# Patient Record
Sex: FEMALE | Race: WHITE | URBAN - METROPOLITAN AREA
[De-identification: names, ages, dates, MRNs, and addresses within clinical notes are randomized per-mention and may not be internally consistent; named-entity substitution may affect disease eponyms.]

---

## 2023-11-06 ENCOUNTER — TELEPHONE (OUTPATIENT)
Dept: GASTROENTEROLOGY | Facility: CLINIC | Age: 63
End: 2023-11-06
Payer: COMMERCIAL

## 2023-11-06 DIAGNOSIS — K57.92 ACUTE DIVERTICULITIS: Primary | ICD-10-CM

## 2023-11-06 RX ORDER — AMOXICILLIN AND CLAVULANATE POTASSIUM 875; 125 MG/1; MG/1
875 TABLET, FILM COATED ORAL 2 TIMES DAILY
Qty: 20 TABLET | Refills: 0 | Status: SHIPPED | OUTPATIENT
Start: 2023-11-06 | End: 2023-11-22 | Stop reason: WASHOUT

## 2023-11-06 NOTE — TELEPHONE ENCOUNTER
Pt states she is having a diverticulitis flare and wants to know if you could send something to pharmacy? Prefer Amoxicillin states it doesn't make sick

## 2023-11-07 ENCOUNTER — HOSPITAL ENCOUNTER (INPATIENT)
Facility: HOSPITAL | Age: 63
LOS: 1 days | Discharge: HOME | DRG: 392 | End: 2023-11-09
Attending: EMERGENCY MEDICINE | Admitting: INTERNAL MEDICINE
Payer: COMMERCIAL

## 2023-11-07 ENCOUNTER — APPOINTMENT (OUTPATIENT)
Dept: RADIOLOGY | Facility: HOSPITAL | Age: 63
DRG: 392 | End: 2023-11-07
Payer: COMMERCIAL

## 2023-11-07 DIAGNOSIS — K57.92 DIVERTICULITIS: Primary | ICD-10-CM

## 2023-11-07 DIAGNOSIS — K57.20 COLONIC DIVERTICULAR ABSCESS: ICD-10-CM

## 2023-11-07 LAB
ALBUMIN SERPL-MCNC: 3.8 G/DL (ref 3.5–5)
ALP BLD-CCNC: 85 U/L (ref 35–125)
ALT SERPL-CCNC: 9 U/L (ref 5–40)
ANION GAP SERPL CALC-SCNC: 14 MMOL/L
APPEARANCE UR: CLEAR
AST SERPL-CCNC: 18 U/L (ref 5–40)
BASOPHILS # BLD AUTO: 0.03 X10*3/UL (ref 0–0.1)
BASOPHILS NFR BLD AUTO: 0.4 %
BILIRUB SERPL-MCNC: 0.3 MG/DL (ref 0.1–1.2)
BILIRUB UR STRIP.AUTO-MCNC: NEGATIVE MG/DL
BUN SERPL-MCNC: 11 MG/DL (ref 8–25)
CALCIUM SERPL-MCNC: 9.1 MG/DL (ref 8.5–10.4)
CHLORIDE SERPL-SCNC: 97 MMOL/L (ref 97–107)
CO2 SERPL-SCNC: 23 MMOL/L (ref 24–31)
COLOR UR: COLORLESS
CREAT SERPL-MCNC: 0.8 MG/DL (ref 0.4–1.6)
EOSINOPHIL # BLD AUTO: 0.11 X10*3/UL (ref 0–0.7)
EOSINOPHIL NFR BLD AUTO: 1.5 %
ERYTHROCYTE [DISTWIDTH] IN BLOOD BY AUTOMATED COUNT: 12.8 % (ref 11.5–14.5)
GFR SERPL CREATININE-BSD FRML MDRD: 82 ML/MIN/1.73M*2
GLUCOSE SERPL-MCNC: 106 MG/DL (ref 65–99)
GLUCOSE UR STRIP.AUTO-MCNC: NORMAL MG/DL
HCT VFR BLD AUTO: 36.2 % (ref 36–46)
HGB BLD-MCNC: 12.4 G/DL (ref 12–16)
IMM GRANULOCYTES # BLD AUTO: 0.02 X10*3/UL (ref 0–0.7)
IMM GRANULOCYTES NFR BLD AUTO: 0.3 % (ref 0–0.9)
KETONES UR STRIP.AUTO-MCNC: NEGATIVE MG/DL
LEUKOCYTE ESTERASE UR QL STRIP.AUTO: NEGATIVE
LIPASE SERPL-CCNC: 17 U/L (ref 16–63)
LYMPHOCYTES # BLD AUTO: 0.72 X10*3/UL (ref 1.2–4.8)
LYMPHOCYTES NFR BLD AUTO: 9.8 %
MCH RBC QN AUTO: 29.5 PG (ref 26–34)
MCHC RBC AUTO-ENTMCNC: 34.3 G/DL (ref 32–36)
MCV RBC AUTO: 86 FL (ref 80–100)
MONOCYTES # BLD AUTO: 0.64 X10*3/UL (ref 0.1–1)
MONOCYTES NFR BLD AUTO: 8.7 %
NEUTROPHILS # BLD AUTO: 5.85 X10*3/UL (ref 1.2–7.7)
NEUTROPHILS NFR BLD AUTO: 79.3 %
NITRITE UR QL STRIP.AUTO: NEGATIVE
NRBC BLD-RTO: 0 /100 WBCS (ref 0–0)
PH UR STRIP.AUTO: 7 [PH]
PLATELET # BLD AUTO: 379 X10*3/UL (ref 150–450)
POTASSIUM SERPL-SCNC: 3.8 MMOL/L (ref 3.4–5.1)
PROT SERPL-MCNC: 7.8 G/DL (ref 5.9–7.9)
PROT UR STRIP.AUTO-MCNC: NEGATIVE MG/DL
RBC # BLD AUTO: 4.21 X10*6/UL (ref 4–5.2)
RBC # UR STRIP.AUTO: NEGATIVE /UL
SODIUM SERPL-SCNC: 134 MMOL/L (ref 133–145)
SP GR UR STRIP.AUTO: 1
UROBILINOGEN UR STRIP.AUTO-MCNC: NORMAL MG/DL
WBC # BLD AUTO: 7.4 X10*3/UL (ref 4.4–11.3)

## 2023-11-07 PROCEDURE — 2500000004 HC RX 250 GENERAL PHARMACY W/ HCPCS (ALT 636 FOR OP/ED): Performed by: EMERGENCY MEDICINE

## 2023-11-07 PROCEDURE — 74177 CT ABD & PELVIS W/CONTRAST: CPT

## 2023-11-07 PROCEDURE — 80053 COMPREHEN METABOLIC PANEL: CPT | Performed by: EMERGENCY MEDICINE

## 2023-11-07 PROCEDURE — 81003 URINALYSIS AUTO W/O SCOPE: CPT | Performed by: EMERGENCY MEDICINE

## 2023-11-07 PROCEDURE — 99285 EMERGENCY DEPT VISIT HI MDM: CPT | Mod: 25 | Performed by: EMERGENCY MEDICINE

## 2023-11-07 PROCEDURE — 36415 COLL VENOUS BLD VENIPUNCTURE: CPT | Performed by: EMERGENCY MEDICINE

## 2023-11-07 PROCEDURE — 2550000001 HC RX 255 CONTRASTS: Performed by: EMERGENCY MEDICINE

## 2023-11-07 PROCEDURE — G0378 HOSPITAL OBSERVATION PER HR: HCPCS

## 2023-11-07 PROCEDURE — 83690 ASSAY OF LIPASE: CPT | Performed by: EMERGENCY MEDICINE

## 2023-11-07 PROCEDURE — 85025 COMPLETE CBC W/AUTO DIFF WBC: CPT | Performed by: EMERGENCY MEDICINE

## 2023-11-07 PROCEDURE — 2500000004 HC RX 250 GENERAL PHARMACY W/ HCPCS (ALT 636 FOR OP/ED): Performed by: NURSE PRACTITIONER

## 2023-11-07 RX ORDER — ONDANSETRON 4 MG/1
4 TABLET, ORALLY DISINTEGRATING ORAL EVERY 8 HOURS PRN
Status: DISCONTINUED | OUTPATIENT
Start: 2023-11-07 | End: 2023-11-09 | Stop reason: HOSPADM

## 2023-11-07 RX ORDER — ACETAMINOPHEN 160 MG/5ML
650 SOLUTION ORAL EVERY 4 HOURS PRN
Status: DISCONTINUED | OUTPATIENT
Start: 2023-11-07 | End: 2023-11-09 | Stop reason: HOSPADM

## 2023-11-07 RX ORDER — SODIUM CHLORIDE 9 MG/ML
75 INJECTION, SOLUTION INTRAVENOUS CONTINUOUS
Status: DISCONTINUED | OUTPATIENT
Start: 2023-11-07 | End: 2023-11-09 | Stop reason: HOSPADM

## 2023-11-07 RX ORDER — ACETAMINOPHEN 325 MG/1
650 TABLET ORAL EVERY 4 HOURS PRN
Status: DISCONTINUED | OUTPATIENT
Start: 2023-11-07 | End: 2023-11-09 | Stop reason: HOSPADM

## 2023-11-07 RX ORDER — POLYETHYLENE GLYCOL 3350 17 G/17G
17 POWDER, FOR SOLUTION ORAL DAILY PRN
Status: DISCONTINUED | OUTPATIENT
Start: 2023-11-07 | End: 2023-11-09 | Stop reason: HOSPADM

## 2023-11-07 RX ORDER — TRAMADOL HYDROCHLORIDE 50 MG/1
50 TABLET ORAL EVERY 6 HOURS PRN
Status: DISCONTINUED | OUTPATIENT
Start: 2023-11-07 | End: 2023-11-09 | Stop reason: HOSPADM

## 2023-11-07 RX ORDER — ACETAMINOPHEN 650 MG/1
650 SUPPOSITORY RECTAL EVERY 4 HOURS PRN
Status: DISCONTINUED | OUTPATIENT
Start: 2023-11-07 | End: 2023-11-09 | Stop reason: HOSPADM

## 2023-11-07 RX ORDER — ACETAMINOPHEN 325 MG/1
650 TABLET ORAL ONCE
Status: DISCONTINUED | OUTPATIENT
Start: 2023-11-07 | End: 2023-11-09 | Stop reason: HOSPADM

## 2023-11-07 RX ORDER — ONDANSETRON HYDROCHLORIDE 2 MG/ML
4 INJECTION, SOLUTION INTRAVENOUS EVERY 8 HOURS PRN
Status: DISCONTINUED | OUTPATIENT
Start: 2023-11-07 | End: 2023-11-09 | Stop reason: HOSPADM

## 2023-11-07 RX ADMIN — PIPERACILLIN SODIUM AND TAZOBACTAM SODIUM 3.38 G: 3; .375 INJECTION, SOLUTION INTRAVENOUS at 23:08

## 2023-11-07 RX ADMIN — IOHEXOL 75 ML: 350 INJECTION, SOLUTION INTRAVENOUS at 18:09

## 2023-11-07 RX ADMIN — SODIUM CHLORIDE 75 ML/HR: 900 INJECTION, SOLUTION INTRAVENOUS at 20:49

## 2023-11-07 RX ADMIN — SODIUM CHLORIDE 500 ML: 900 INJECTION, SOLUTION INTRAVENOUS at 16:46

## 2023-11-07 RX ADMIN — CEFOXITIN 2 G: 2 INJECTION, POWDER, FOR SOLUTION INTRAVENOUS at 20:19

## 2023-11-07 SDOH — SOCIAL STABILITY: SOCIAL INSECURITY: DOES ANYONE TRY TO KEEP YOU FROM HAVING/CONTACTING OTHER FRIENDS OR DOING THINGS OUTSIDE YOUR HOME?: NO

## 2023-11-07 SDOH — SOCIAL STABILITY: SOCIAL INSECURITY: ARE YOU OR HAVE YOU BEEN THREATENED OR ABUSED PHYSICALLY, EMOTIONALLY, OR SEXUALLY BY ANYONE?: NO

## 2023-11-07 SDOH — SOCIAL STABILITY: SOCIAL INSECURITY: ARE THERE ANY APPARENT SIGNS OF INJURIES/BEHAVIORS THAT COULD BE RELATED TO ABUSE/NEGLECT?: NO

## 2023-11-07 SDOH — SOCIAL STABILITY: SOCIAL INSECURITY: WERE YOU ABLE TO COMPLETE ALL THE BEHAVIORAL HEALTH SCREENINGS?: YES

## 2023-11-07 SDOH — SOCIAL STABILITY: SOCIAL INSECURITY: DO YOU FEEL ANYONE HAS EXPLOITED OR TAKEN ADVANTAGE OF YOU FINANCIALLY OR OF YOUR PERSONAL PROPERTY?: NO

## 2023-11-07 SDOH — SOCIAL STABILITY: SOCIAL INSECURITY: HAS ANYONE EVER THREATENED TO HURT YOUR FAMILY OR YOUR PETS?: NO

## 2023-11-07 SDOH — SOCIAL STABILITY: SOCIAL INSECURITY: HAVE YOU HAD THOUGHTS OF HARMING ANYONE ELSE?: NO

## 2023-11-07 SDOH — SOCIAL STABILITY: SOCIAL INSECURITY: ABUSE: ADULT

## 2023-11-07 SDOH — SOCIAL STABILITY: SOCIAL INSECURITY: DO YOU FEEL UNSAFE GOING BACK TO THE PLACE WHERE YOU ARE LIVING?: NO

## 2023-11-07 ASSESSMENT — PAIN - FUNCTIONAL ASSESSMENT
PAIN_FUNCTIONAL_ASSESSMENT: 0-10
PAIN_FUNCTIONAL_ASSESSMENT: 0-10

## 2023-11-07 ASSESSMENT — LIFESTYLE VARIABLES
HOW OFTEN DURING THE LAST YEAR HAVE YOU FAILED TO DO WHAT WAS NORMALLY EXPECTED FROM YOU BECAUSE OF DRINKING: NEVER
AUDIT TOTAL SCORE: 0
HOW OFTEN DURING THE LAST YEAR HAVE YOU NEEDED AN ALCOHOLIC DRINK FIRST THING IN THE MORNING TO GET YOURSELF GOING AFTER A NIGHT OF HEAVY DRINKING: NEVER
HAS A RELATIVE, FRIEND, DOCTOR, OR ANOTHER HEALTH PROFESSIONAL EXPRESSED CONCERN ABOUT YOUR DRINKING OR SUGGESTED YOU CUT DOWN: NO
HOW OFTEN DURING THE LAST YEAR HAVE YOU FOUND THAT YOU WERE NOT ABLE TO STOP DRINKING ONCE YOU HAD STARTED: NEVER
HOW OFTEN DURING THE LAST YEAR HAVE YOU HAD A FEELING OF GUILT OR REMORSE AFTER DRINKING: NEVER
SKIP TO QUESTIONS 9-10: 0
AUDIT-C TOTAL SCORE: 5
HOW OFTEN DO YOU HAVE 6 OR MORE DRINKS ON ONE OCCASION: LESS THAN MONTHLY
HOW OFTEN DO YOU HAVE A DRINK CONTAINING ALCOHOL: 2-3 TIMES A WEEK
AUDIT-C TOTAL SCORE: 5
HOW OFTEN DURING THE LAST YEAR HAVE YOU BEEN UNABLE TO REMEMBER WHAT HAPPENED THE NIGHT BEFORE BECAUSE YOU HAD BEEN DRINKING: NEVER
HOW MANY STANDARD DRINKS CONTAINING ALCOHOL DO YOU HAVE ON A TYPICAL DAY: 3 OR 4
HAVE YOU OR SOMEONE ELSE BEEN INJURED AS A RESULT OF YOUR DRINKING: NO
AUDIT TOTAL SCORE: 5

## 2023-11-07 ASSESSMENT — PATIENT HEALTH QUESTIONNAIRE - PHQ9
SUM OF ALL RESPONSES TO PHQ9 QUESTIONS 1 & 2: 0
1. LITTLE INTEREST OR PLEASURE IN DOING THINGS: NOT AT ALL
2. FEELING DOWN, DEPRESSED OR HOPELESS: NOT AT ALL

## 2023-11-07 ASSESSMENT — COLUMBIA-SUICIDE SEVERITY RATING SCALE - C-SSRS
6. HAVE YOU EVER DONE ANYTHING, STARTED TO DO ANYTHING, OR PREPARED TO DO ANYTHING TO END YOUR LIFE?: NO
2. HAVE YOU ACTUALLY HAD ANY THOUGHTS OF KILLING YOURSELF?: NO
1. IN THE PAST MONTH, HAVE YOU WISHED YOU WERE DEAD OR WISHED YOU COULD GO TO SLEEP AND NOT WAKE UP?: NO

## 2023-11-07 ASSESSMENT — ACTIVITIES OF DAILY LIVING (ADL)
JUDGMENT_ADEQUATE_SAFELY_COMPLETE_DAILY_ACTIVITIES: YES
PATIENT'S MEMORY ADEQUATE TO SAFELY COMPLETE DAILY ACTIVITIES?: YES
ADEQUATE_TO_COMPLETE_ADL: YES
DRESSING YOURSELF: INDEPENDENT
GROOMING: INDEPENDENT
TOILETING: INDEPENDENT
HEARING - RIGHT EAR: FUNCTIONAL
HEARING - LEFT EAR: FUNCTIONAL
WALKS IN HOME: INDEPENDENT
BATHING: INDEPENDENT
FEEDING YOURSELF: INDEPENDENT
LACK_OF_TRANSPORTATION: NO

## 2023-11-07 ASSESSMENT — COGNITIVE AND FUNCTIONAL STATUS - GENERAL
DAILY ACTIVITIY SCORE: 24
PATIENT BASELINE BEDBOUND: NO
MOBILITY SCORE: 24

## 2023-11-07 ASSESSMENT — PAIN SCALES - GENERAL
PAINLEVEL_OUTOF10: 6
PAINLEVEL_OUTOF10: 2

## 2023-11-07 NOTE — ED PROVIDER NOTES
Pt Name: Ivana Funes  MRN: [id number]  Birthdate 1960  Date of evaluation: 11/7/2023  [facility]      CHIEF COMPLAINT    Chief Complaint   Patient presents with    Abdominal Pain     Pt complains of intermittent abd pain and constipation for about 1.5 weeks.  Pain gets worse when eating. States hx of diverticulitis.  Talked to her doctor and is taking an antibiotic x2 days.  Pt is concerned that her symptoms will not clear up by next Wednesday when she leaves for vacation and wants to know if she needs something stronger.        HPI  63 y.o. female presents with with left lower quadrant abdominal pain, constipation.  Symptoms going on for approximately 1 and half weeks.  History of diverticulitis.  She spoke to her doctor and was ordered antibiotics.  She reports she is going on vacation and would like to make sure the vacation will be okay.  Pain does get severe.    REVIEW OF SYSTEMS     Review of Systems    Pmh:  Patient Active Problem List   Diagnosis    Diverticulitis       CURRENT MEDICATIONS       Previous Medications    AMOXICILLIN-POT CLAVULANATE (AUGMENTIN) 875-125 MG TABLET    Take 1 tablet (875 mg) by mouth 2 times a day for 10 days.       No family history on file.    Social Determinants of Health     Tobacco Use: Not on file   Alcohol Use: Not on file   Financial Resource Strain: Not on file   Food Insecurity: Not on file   Transportation Needs: Not on file   Physical Activity: Not on file   Stress: Not on file   Social Connections: Not on file   Intimate Partner Violence: Not on file   Depression: Not on file   Housing Stability: Not on file   Utilities: Not on file   Digital Equity: Not on file       Physical Exam  Vitals and nursing note reviewed.   Constitutional:       Appearance: She is normal weight. She is not toxic-appearing.   Abdominal:      General: Bowel sounds are normal. There is no distension.      Palpations: Abdomen is soft.      Tenderness: There is no abdominal tenderness. There  "is no right CVA tenderness, left CVA tenderness, guarding or rebound. Negative signs include Ramey's sign and McBurney's sign.      Hernia: No hernia is present.   Skin:     General: Skin is warm.      Coloration: Skin is not jaundiced.   Neurological:      Mental Status: She is alert.      Gait: Gait is intact.   Psychiatric:         Behavior: Behavior normal.         Thought Content: Thought content normal.       Vitals:    11/07/23 1559   BP: (!) 158/111   Pulse: 93   Resp: 18   Temp: 36.8 °C (98.2 °F)   TempSrc: Oral   SpO2: 98%   Weight: 69.9 kg (154 lb 1.6 oz)   Height: 1.626 m (5' 4\")         Medical Decision Making       SCREENINGS          Imgaing:  CT abdomen pelvis w IV contrast   Final Result   Interval worsening in mural thickening and inflammatory changes   surrounding the sigmoid colon compatible with acute on chronic   diverticulitis. Interval increase in size in a small loculated fluid   collection adjacent to the sigmoid colon now measuring 3 x 2 cm.        MACRO:   None        Signed by: Rachael Pedroza 11/7/2023 6:17 PM   Dictation workstation:   [id number]      Consult to Interventional Radiology    (Results Pending)       Labs:  Labs Reviewed   CBC WITH AUTO DIFFERENTIAL - Abnormal       Result Value    WBC 7.4      nRBC 0.0      RBC 4.21      Hemoglobin 12.4      Hematocrit 36.2      MCV 86      MCH 29.5      MCHC 34.3      RDW 12.8      Platelets 379      Neutrophils % 79.3      Immature Granulocytes %, Automated 0.3      Lymphocytes % 9.8      Monocytes % 8.7      Eosinophils % 1.5      Basophils % 0.4      Neutrophils Absolute 5.85      Immature Granulocytes Absolute, Automated 0.02      Lymphocytes Absolute 0.72 (*)     Monocytes Absolute 0.64      Eosinophils Absolute 0.11      Basophils Absolute 0.03     COMPREHENSIVE METABOLIC PANEL - Abnormal    Glucose 106 (*)     Sodium 134      Potassium 3.8      Chloride 97      Bicarbonate 23 (*)     Urea Nitrogen 11      Creatinine 0.80      " eGFR 82      Calcium 9.1      Albumin 3.8      Alkaline Phosphatase 85      Total Protein 7.8      AST 18      Bilirubin, Total 0.3      ALT 9      Anion Gap 14     URINALYSIS WITH REFLEX MICROSCOPIC - Abnormal    Color, Urine Colorless (*)     Appearance, Urine Clear      Specific Gravity, Urine 1.004 (*)     pH, Urine 7.0      Protein, Urine NEGATIVE      Glucose, Urine Normal      Blood, Urine NEGATIVE      Ketones, Urine NEGATIVE      Bilirubin, Urine NEGATIVE      Urobilinogen, Urine Normal      Nitrite, Urine NEGATIVE      Leukocyte Esterase, Urine NEGATIVE     LIPASE - Normal    Lipase 17         EKG:  No orders to display       Medications ordered:  Medications   acetaminophen (Tylenol) tablet 650 mg (650 mg oral Not Given 11/7/23 1625)   cefOXitin (Mefoxin) 2 g in dextrose 5 % 100 mL IV (has no administration in time range)   sodium chloride 0.9 % bolus 500 mL (0 mL intravenous Stopped 11/7/23 1746)   iohexol (OMNIPaque) 350 mg iodine/mL solution 100 mL (75 mL intravenous Given 11/7/23 1809)         Orders placed during visit:  CT abdomen pelvis w IV contrast   Final Result   Interval worsening in mural thickening and inflammatory changes   surrounding the sigmoid colon compatible with acute on chronic   diverticulitis. Interval increase in size in a small loculated fluid   collection adjacent to the sigmoid colon now measuring 3 x 2 cm.        MACRO:   None        Signed by: Rachael Pedroza 11/7/2023 6:17 PM   Dictation workstation:   [id number]      Consult to Interventional Radiology    (Results Pending)       Diagnoses as of 11/07/23 1852   Diverticulitis   Colonic diverticular abscess       CONSULTS:  IP CONSULT TO GASTROENTEROLOGY    CRITICAL CARE TIME          MDM: 63 y.o. presented with  complaint of llq pain.  The differential diagnosis considered: Diverticulitis kidney stone urine infection, bowel perforation anemia electrolyte problems kidney problems.  Our workup consisted of  ordering/reviewing:   Orders Placed This Encounter   Procedures    CT abdomen pelvis w IV contrast    Consult to Interventional Radiology    CBC and Auto Differential    Comprehensive metabolic panel    Lipase    Urinalysis with Reflex Microscopic    NPO Diet; Effective now    Notify provider (specify parameters)    Pain Assessment    Weight on admission    Height on admission    No Isolation Required    Activity (specify) Out of Bed with Assistance    Vital Signs    Inpatient consult to Gastroenterology    Electrocardiogram, 12-lead PRN ACS symptoms    Initiate observation status    ED to floor bed request      Ct with diverticulitis with 3 cm abscess. < 4cm iv abx, gi cosult, ir consult to see if candidate for percutaneous drainage          Clinical impression:  1. Diverticulitis        2. Colonic diverticular abscess            DISPOSITION/PLAN   DISPOSITION Admit 11/07/2023 06:47:38 PM     Case was discussed with hospitalist Dr. Brittany Horne MD and patient was accepted for hospitalization.  I prescribed the following medications:  New Prescriptions    No medications on file       PATIENT REFERRED TO:  No follow-up provider specified.       Matt Razo MD  11/07/23 1282

## 2023-11-07 NOTE — LETTER
November 9, 2023     Patient: Ivana Funes   YOB: 1960   Date of Visit: 11/7/2023       To Whom It May Concern:    Ivana Funes was at [facility] from 11/7/2023 to 11/9/2023. Please excuse Ivana for her absence from work on these days. Okay to return to work on 11/13/2023 without restrictions.     If you have any questions or concerns, please don't hesitate to call [phone]         Sincerely,         Yessica Lara, CNP        CC: No Recipients

## 2023-11-08 LAB
ANION GAP SERPL CALC-SCNC: 9 MMOL/L
BUN SERPL-MCNC: 7 MG/DL (ref 8–25)
CALCIUM SERPL-MCNC: 8.4 MG/DL (ref 8.5–10.4)
CHLORIDE SERPL-SCNC: 111 MMOL/L (ref 97–107)
CO2 SERPL-SCNC: 21 MMOL/L (ref 24–31)
CREAT SERPL-MCNC: 0.8 MG/DL (ref 0.4–1.6)
ERYTHROCYTE [DISTWIDTH] IN BLOOD BY AUTOMATED COUNT: 13 % (ref 11.5–14.5)
GFR SERPL CREATININE-BSD FRML MDRD: 82 ML/MIN/1.73M*2
GLUCOSE SERPL-MCNC: 108 MG/DL (ref 65–99)
HCT VFR BLD AUTO: 33.2 % (ref 36–46)
HGB BLD-MCNC: 11.1 G/DL (ref 12–16)
MCH RBC QN AUTO: 29.1 PG (ref 26–34)
MCHC RBC AUTO-ENTMCNC: 33.4 G/DL (ref 32–36)
MCV RBC AUTO: 87 FL (ref 80–100)
NRBC BLD-RTO: 0 /100 WBCS (ref 0–0)
PLATELET # BLD AUTO: 331 X10*3/UL (ref 150–450)
POTASSIUM SERPL-SCNC: 3.9 MMOL/L (ref 3.4–5.1)
RBC # BLD AUTO: 3.81 X10*6/UL (ref 4–5.2)
SODIUM SERPL-SCNC: 141 MMOL/L (ref 133–145)
WBC # BLD AUTO: 6.9 X10*3/UL (ref 4.4–11.3)

## 2023-11-08 PROCEDURE — 36415 COLL VENOUS BLD VENIPUNCTURE: CPT | Performed by: NURSE PRACTITIONER

## 2023-11-08 PROCEDURE — 2500000004 HC RX 250 GENERAL PHARMACY W/ HCPCS (ALT 636 FOR OP/ED): Performed by: NURSE PRACTITIONER

## 2023-11-08 PROCEDURE — 85027 COMPLETE CBC AUTOMATED: CPT | Performed by: NURSE PRACTITIONER

## 2023-11-08 PROCEDURE — 1100000001 HC PRIVATE ROOM DAILY

## 2023-11-08 PROCEDURE — 80048 BASIC METABOLIC PNL TOTAL CA: CPT | Performed by: NURSE PRACTITIONER

## 2023-11-08 PROCEDURE — 82374 ASSAY BLOOD CARBON DIOXIDE: CPT | Performed by: NURSE PRACTITIONER

## 2023-11-08 RX ADMIN — PIPERACILLIN SODIUM AND TAZOBACTAM SODIUM 3.38 G: 3; .375 INJECTION, SOLUTION INTRAVENOUS at 17:31

## 2023-11-08 RX ADMIN — SODIUM CHLORIDE 75 ML/HR: 900 INJECTION, SOLUTION INTRAVENOUS at 20:42

## 2023-11-08 RX ADMIN — PIPERACILLIN SODIUM AND TAZOBACTAM SODIUM 3.38 G: 3; .375 INJECTION, SOLUTION INTRAVENOUS at 05:32

## 2023-11-08 RX ADMIN — ACETAMINOPHEN 650 MG: 325 TABLET ORAL at 08:14

## 2023-11-08 RX ADMIN — PIPERACILLIN SODIUM AND TAZOBACTAM SODIUM 3.38 G: 3; .375 INJECTION, SOLUTION INTRAVENOUS at 12:01

## 2023-11-08 RX ADMIN — PIPERACILLIN SODIUM AND TAZOBACTAM SODIUM 3.38 G: 3; .375 INJECTION, SOLUTION INTRAVENOUS at 23:04

## 2023-11-08 ASSESSMENT — PAIN - FUNCTIONAL ASSESSMENT
PAIN_FUNCTIONAL_ASSESSMENT: 0-10

## 2023-11-08 ASSESSMENT — COGNITIVE AND FUNCTIONAL STATUS - GENERAL
DAILY ACTIVITIY SCORE: 24
DAILY ACTIVITIY SCORE: 24
MOBILITY SCORE: 24

## 2023-11-08 ASSESSMENT — PAIN SCALES - GENERAL
PAINLEVEL_OUTOF10: 4
PAINLEVEL_OUTOF10: 0 - NO PAIN
PAINLEVEL_OUTOF10: 2
PAINLEVEL_OUTOF10: 0 - NO PAIN
PAINLEVEL_OUTOF10: 0 - NO PAIN

## 2023-11-08 ASSESSMENT — ENCOUNTER SYMPTOMS
DIARRHEA: 1
RESPIRATORY NEGATIVE: 1
NAUSEA: 1
ALLERGIC/IMMUNOLOGIC NEGATIVE: 1
NEUROLOGICAL NEGATIVE: 1
ABDOMINAL PAIN: 1
MUSCULOSKELETAL NEGATIVE: 1
PSYCHIATRIC NEGATIVE: 1
CARDIOVASCULAR NEGATIVE: 1
HEMATOLOGIC/LYMPHATIC NEGATIVE: 1
CONSTITUTIONAL NEGATIVE: 1
ENDOCRINE NEGATIVE: 1
EYES NEGATIVE: 1

## 2023-11-08 NOTE — PROGRESS NOTES
Spiritual Care Visit    Clinical Encounter Type  Visited With: Patient  Routine Visit: Introduction  Continue Visiting: No         Values/Beliefs  Spiritual Requests During Hospitalization: [place] only. No sacraments needed by patient    Sacramental Encounters  Communion: Does not want communion  Communion Given Indicator: No  Sacrament of Sick-Anointing: Patient declined anointing     Miguel A Moore

## 2023-11-08 NOTE — PROGRESS NOTES
Spoke with patient bedside.  She is independent at home with spouse.  Receiving IV antibiotics for abscess.  Has no needs going home.  Faina Mcfarlane RN

## 2023-11-08 NOTE — CARE PLAN
The patient's goals for the shift include  manage pain    The clinical goals for the shift include comfort and safety, IV antibx, pain management.    No barriers to meeting these goals.      Problem: Pain  Goal: My pain/discomfort is manageable  Outcome: Progressing     Problem: Safety  Goal: Patient will be injury free during hospitalization  Outcome: Progressing  Goal: I will remain free of falls  Outcome: Progressing     Problem: Daily Care  Goal: Daily care needs are met  Outcome: Progressing     Problem: Pain  Goal: Takes deep breaths with improved pain control throughout the shift  Outcome: Progressing  Goal: Walks with improved pain control throughout the shift  Outcome: Progressing  Goal: Free from opioid side effects throughout the shift  Outcome: Progressing

## 2023-11-08 NOTE — H&P
History Of Present Illness  Ivana Funes is a 63 y.o. female presenting with abdominal pain and known history of diverticulitis last year. States she noticed her symptoms starting approx 1 week ago with intermittent abd pain with nausea. She thought it was improving and decided to try and eat a normal meal on Friday night. Saturday she had significant abdominal pain with nausea and low grade fevers. She called her GI doctor on Monday requesting abx and was sent in a script for augmentin (she has not tolerated cipro/flagyl in the past). Today her symptoms were not improving so she came in for further eval. Her pain is more localized on the left side. She states she has actually been more constipated, had loose BM today without blood. Pain worse with BM's. She denies any chest pain, palpitations, SOB, urinary symptoms.   CT abd/pelvis demonstrated diverticulitis with small loculated fluid collection 3x2 cm.   ED: cefoxitin, tylenol, IVF bolus.     She will be admitted for further work up and management of acute on chronic diverticulitis with abscess.      Past Medical History  Past Medical History:   Diagnosis Date    Diverticulitis of intestine with abscess     Flushing     Hot flashes       Surgical History  Past Surgical History:   Procedure Laterality Date    OOPHORECTOMY  04/10/2014    Oophorectomy    OOPHORECTOMY Bilateral         Social History  Home with her spouse. Denies tobacco use. ETOH on occasion.    Family History  DM and HTN in the family. States her mother had diverticulitis and her father had diverticulosis.      Allergies  Patient has no known allergies.    Review of Systems    Please see pertinent positives in the HPI and past medical and surgical histories.     Physical Exam    General: Pleasant, awake, alert.   HEENT: PERRLA, no facial asymmetry noted. Normocephalic, mucous membranes moist.   Neck: No JVD, supple.  Cardiovascular: Regular rate and rhythm. Normal S1 and S2. No murmurs, rubs or  "gallops.   Respiratory: Clear to auscultation on room air.   Abdomen: Soft, round, tender to palpation in the LUQ, LLQ. Bowel sounds present and normoactive.  Extremities: No peripheral cyanosis. No edema.   Neurologic: Alert and oriented to person, place and time. Normal sensation.   Dermatologic: No rash, ecchymosis, or jaundice.  Psychological: Appropriate affect and behavior.     Last Recorded Vitals  Blood pressure (!) 158/111, pulse 93, temperature 36.8 °C (98.2 °F), temperature source Oral, resp. rate 18, height 1.626 m (5' 4\"), weight 69.9 kg (154 lb 1.6 oz), SpO2 98 %.    Relevant Results    Scheduled medications  acetaminophen, 650 mg, oral, Once  cefOXitin, 2 g, intravenous, Once      Continuous medications     PRN medications     Results for orders placed or performed during the hospital encounter of 11/07/23 (from the past 24 hour(s))   CBC and Auto Differential   Result Value Ref Range    WBC 7.4 4.4 - 11.3 x10*3/uL    nRBC 0.0 0.0 - 0.0 /100 WBCs    RBC 4.21 4.00 - 5.20 x10*6/uL    Hemoglobin 12.4 12.0 - 16.0 g/dL    Hematocrit 36.2 36.0 - 46.0 %    MCV 86 80 - 100 fL    MCH 29.5 26.0 - 34.0 pg    MCHC 34.3 32.0 - 36.0 g/dL    RDW 12.8 11.5 - 14.5 %    Platelets 379 150 - 450 x10*3/uL    Neutrophils % 79.3 40.0 - 80.0 %    Immature Granulocytes %, Automated 0.3 0.0 - 0.9 %    Lymphocytes % 9.8 13.0 - 44.0 %    Monocytes % 8.7 2.0 - 10.0 %    Eosinophils % 1.5 0.0 - 6.0 %    Basophils % 0.4 0.0 - 2.0 %    Neutrophils Absolute 5.85 1.20 - 7.70 x10*3/uL    Immature Granulocytes Absolute, Automated 0.02 0.00 - 0.70 x10*3/uL    Lymphocytes Absolute 0.72 (L) 1.20 - 4.80 x10*3/uL    Monocytes Absolute 0.64 0.10 - 1.00 x10*3/uL    Eosinophils Absolute 0.11 0.00 - 0.70 x10*3/uL    Basophils Absolute 0.03 0.00 - 0.10 x10*3/uL   Comprehensive metabolic panel   Result Value Ref Range    Glucose 106 (H) 65 - 99 mg/dL    Sodium 134 133 - 145 mmol/L    Potassium 3.8 3.4 - 5.1 mmol/L    Chloride 97 97 - 107 mmol/L    " Bicarbonate 23 (L) 24 - 31 mmol/L    Urea Nitrogen 11 8 - 25 mg/dL    Creatinine 0.80 0.40 - 1.60 mg/dL    eGFR 82 >60 mL/min/1.73m*2    Calcium 9.1 8.5 - 10.4 mg/dL    Albumin 3.8 3.5 - 5.0 g/dL    Alkaline Phosphatase 85 35 - 125 U/L    Total Protein 7.8 5.9 - 7.9 g/dL    AST 18 5 - 40 U/L    Bilirubin, Total 0.3 0.1 - 1.2 mg/dL    ALT 9 5 - 40 U/L    Anion Gap 14 <=19 mmol/L   Lipase   Result Value Ref Range    Lipase 17 16 - 63 U/L   Urinalysis with Reflex Microscopic   Result Value Ref Range    Color, Urine Colorless (N) Light-Yellow, Yellow, Dark-Yellow    Appearance, Urine Clear Clear    Specific Gravity, Urine 1.004 (N) 1.005 - 1.035    pH, Urine 7.0 5.0, 5.5, 6.0, 6.5, 7.0, 7.5, 8.0    Protein, Urine NEGATIVE NEGATIVE, 10 (TRACE), 20 (TRACE) mg/dL    Glucose, Urine Normal Normal mg/dL    Blood, Urine NEGATIVE NEGATIVE    Ketones, Urine NEGATIVE NEGATIVE mg/dL    Bilirubin, Urine NEGATIVE NEGATIVE    Urobilinogen, Urine Normal Normal mg/dL    Nitrite, Urine NEGATIVE NEGATIVE    Leukocyte Esterase, Urine NEGATIVE NEGATIVE        CT abdomen pelvis w IV contrast 11/07/2023    Narrative  Interpreted By:  Rachael Pedroza,  STUDY:  CT ABDOMEN PELVIS W IV CONTRAST; 11/7/2023 6:08 pm    INDICATION:  Signs/Symptoms:llq pain. with po contrast;    COMPARISON:  February 2022    ACCESSION NUMBER(S):  [id number]    ORDERING CLINICIAN:  ADRIANA SAHA    TECHNIQUE:  Contiguous axial images of the abdomen/pelvis were performed with IV  contrast. 75 ml of Omnipaque 350 was utilized. All CT examinations  are performed with 1 or more of the following dose reduction  techniques: Automated exposure control, adjustment of mA and/or kv  according to patient's size, or use of iterative reconstruction  techniques.    FINDINGS:  There has been interval worsening and mural thickening and  inflammatory changes surrounding the sigmoid colon. There has been  interval increase in size of the loculated fluid collection adjacent  to the  sigmoid colon measuring approximately 3 x 2 cm. There is again  diffuse diverticulosis.    The liver, gallbladder,  common bile duct, pancreas, spleen, and  adrenal glands are unremarkable.    The kidneys enhance symmetrically.  No urolithiasis is seen. No  hydroureteronephrosis is seen.    The visualized aorta is unremarkable.    The small bowel is not dilated.    No free intraperitoneal air or fluid is seen.    The bladder is decompressed.    The visualized osseous structures are intact.    Limited images of the lower thorax are unremarkable.    Impression  Interval worsening in mural thickening and inflammatory changes  surrounding the sigmoid colon compatible with acute on chronic  diverticulitis. Interval increase in size in a small loculated fluid  collection adjacent to the sigmoid colon now measuring 3 x 2 cm.    MACRO:  None    Signed by: Rachael Pedroza 11/7/2023 6:17 PM  Dictation workstation:   [id number]      Assessment/Plan     Diverticulitis with Abscess  -General Surgery and IR consults.   -Hx of diverticulitis last year, was hospitalized at [facility], she does tell me there was abscess at that time, but was too small to drain.   -Will cover for now with Zosyn, she tells me she has had intolerances to cipro and flagyl in the past and prefers using a different abx.   -Pain control.   -Clear liquid diet for now.   -IVF hydration.   -Monitor labs, no leukocytosis present at this time.     Abdominal Pain with Nausea  -Secondary to #1.   -IVF hydration.   -PRN anti-emetics.     Elevated Blood Pressure without Dx HTN  -Likely from pain, will monitor.   -Consider adding medication if this persists.     DVT Risk  -Will hold off on any pharm agent for now in case drainage of the abscess is needed.   -SCDs for now.     Plan  Case and plan was discussed with the patient, she is agreeable.   Case and plan was also discussed with my collaborating physician.     CODE STATUS was discussed, she wishes to be FULL CODE.           I spent 45 minutes in the professional and overall care of this patient.      Madhavi William, APRN-CNP

## 2023-11-08 NOTE — CONSULTS
Nutrition Assessment Note    Reason for Hospital Admission:  Ivana Funes is a 63 y.o. female who is admitted for diverticulitis with abscess. Possible plan for drainage. Consult for diet education - information provided and reviewed with pt. See below.     Nutrition Assessment:  Reason for Assessment  Reason for Assessment: Provider consult order (diet education)     has a past medical history of Diverticulitis of intestine with abscess and Flushing.     No Known Allergies     Lab Results   Component Value Date    WBC 6.9 11/08/2023    HGB 11.1 (L) 11/08/2023    HCT 33.2 (L) 11/08/2023     11/08/2023    ALT 9 11/07/2023    AST 18 11/07/2023     11/08/2023    K 3.9 11/08/2023     (H) 11/08/2023    CREATININE 0.80 11/08/2023    BUN 7 (L) 11/08/2023    CO2 21 (L) 11/08/2023    HGBA1C 5.5 06/18/2020       Current Facility-Administered Medications:     acetaminophen (Tylenol) tablet 650 mg, 650 mg, oral, q4h PRN, 650 mg at 11/08/23 0814 **OR** acetaminophen (Tylenol) oral liquid 650 mg, 650 mg, oral, q4h PRN **OR** acetaminophen (Tylenol) suppository 650 mg, 650 mg, rectal, q4h PRN, Madhavi Marcrum, APRN-CNP    acetaminophen (Tylenol) tablet 650 mg, 650 mg, oral, Once, Madhavi Nataliia, APRN-CNP    ondansetron ODT (Zofran-ODT) disintegrating tablet 4 mg, 4 mg, oral, q8h PRN **OR** ondansetron (Zofran) injection 4 mg, 4 mg, intravenous, q8h PRN, Madhavi Nataliia, APRN-CNP    piperacillin-tazobactam-dextrose (Zosyn) IV 3.375 g, 3.375 g, intravenous, q6h, Madhavi Nataliia, APRN-CNP, Stopped at 11/08/23 1231    polyethylene glycol (Glycolax, Miralax) packet 17 g, 17 g, oral, Daily PRN, Madhavi Nataliia, APRN-CNP    sodium chloride 0.9% infusion, 75 mL/hr, intravenous, Continuous, Madhavi Marcrum, APRN-CNP, Last Rate: 75 mL/hr at 11/07/23 2049, 75 mL/hr at 11/07/23 2049    traMADol (Ultram) tablet 50 mg, 50 mg, oral, q6h PRN, Madhavi Marcrum, APRN-CNP    Dietary Orders (From admission, onward)        "Start     Ordered    11/07/23 2013  Adult diet Clear Liquid  Diet effective now        Question:  Diet type  Answer:  Clear Liquid    11/07/23 2012                  History:  Food and Nutrient History  Energy Intake:  (tolerating clear liquid diet)  Food and Nutrient History: per pt, she was following a liquid diet/foods soft in texture prior to admission d/t abd pain. prior to abd pain starting, she was trying to limit herself to eating one meal per day to aid in wt loss.    Anthropometrics:  Ht: 162.6 cm (5' 4\"), Wt: 69.9 kg (154 lb 1.6 oz), BMI: 26.44    Weight Change  Weight History / % Weight Change: denies wt changes. usual wt 150#    IBW/kg (Dietitian Calculated): 54.55 kg    Estimated Energy Needs  Total Energy Estimated Needs (kCal):  (6705-7725)  Total Estimated Energy Need per Day (kCal/kg):  (25-30)  Method for Estimating Needs: IBW    Estimated Protein Needs  Total Protein Estimated Needs (g):  (55-65)  Total Protein Estimated Needs (g/kg):  (1-1.2)  Method for Estimating Needs: IBW    Estimated Fluid Needs  Total Fluid Estimated Needs (mL):  (7874-8675)  Total Fluid Estimated Needs (mL/kg):  (25-30)  Method for Estimating Needs: IBW    Nutrition Focused Physical Findings:  Subcutaneous Fat Loss  Orbital Fat Pads: Well nourshed (slightly bulging fat pads)  Buccal Fat Pads: Well nourished (full, rounded cheeks)    Muscle Wasting  Temporalis: Well nourished (well-defined muscle)  Pectoralis (Clavicular Region): Well nourished (clavicle not visible)  Deltoid/Trapezius: Well nourished (rounded appearance at arm, shoulder, neck)  Interosseous: Well nourished (muscle bulges)  Trapezius/Infraspinatus/Supraspinatus (Scapular Region): Well nourished (bones not prominent, muscle taut)    Nutrition Diagnosis:  Malnutrition Diagnosis  Patient has Malnutrition Diagnosis: No    Patient has Nutrition Diagnosis: Yes  Nutrition Diagnosis 1: Food and nutrition related knowledge deficit  Diagnosis Status (1): New  Related " to (1): lack of prior exposure to info  As Evidenced by (1): request for diet info    Nutrition Diagnosis 2: Inadequate oral intake  Diagnosis Status (2): New  Related to (2): decreased ability to consume sufficient energy  As Evidenced by (2): CL diet    Nutrition Interventions/Recommendations:  Food and/or Nutrient Delivery Interventions  Interventions: Meals and snacks    Meals and Snacks: Fiber-modified diet  Goal: advance to soft diet as able    Education Documentation  Nutrition Care Manual, taught by Rachel Doyle RD, LD at 11/8/2023 11:55 AM.  Learner: Patient  Readiness: Acceptance  Method: Explanation, Handout  Response: Verbalizes Understanding  Comment: Provided and reviewed diet info for diverticulitis/diverticulosis. Also reviewed the importance of consuming adequate energy and protein intake throughout the day to aid in maintenance of lean muscle mass.        Nutrition Monitoring/Evaluation:  Food and Nutrient Related History  Energy Intake: Estimated energy intake  Criteria: monitor for diet advancement    Nutrition education  Criteria: Patients/family/caregiver will be able to plan meals within prescribed guidelines     RD Recommendations: None at this time.      Follow Up  Time Spent (min): 25 minutes  Last Date of Nutrition Visit: 11/08/23  Nutrition Follow-Up Needed?: 3-5 days  Follow up Comment: 11/10/23

## 2023-11-08 NOTE — H&P
History Of Present Illness  Ivana Funes is a 63 y.o. female presenting with abdominal pain with nausea, fever, chills. No vomiting.Patient with known diverticulitis. She was hospitalized at [facility] for this last year. At that time there was a diverticular abscess that was too small to drain.  She has been given Cipro /Flagyl in the past which she cannot tolerate. Her discomfort started about 1-1.5 weeks ago when she experienced some intermittent abdominal pain with nausea and fever with chills.  At the beginning of her symptoms she did go on a clear liquid diet and did start to feel better.  She tried to eat on Friday evening.  By Saturday she was having significant pain with nausea and fever.  She did call her GI physician and a prescription for Augmentin was sent in. Her symptoms continued and she presented for evaluation.  Her pain in localized to the left side and mid low abdomen.  She was previously constipated but is now having loose stools.  Pain with bowel movements. No blood.  Previous colonoscopy was done after her bout of diverticulitis at [facility] in 2022 that demonstrated diverticulitis per patient.     CT scan demonstrated diverticulitis with a small loculated fluid collection 3 x 2 cm.  . IR consult has been placed for possible drainage.     Past Medical History  Past Medical History:   Diagnosis Date    Diverticulitis of intestine with abscess     Flushing     Hot flashes       Surgical History  Past Surgical History:   Procedure Laterality Date    OOPHORECTOMY  04/10/2014    Oophorectomy    OOPHORECTOMY Bilateral         Social History  She reports that she has never smoked. She has never used smokeless tobacco. She reports current alcohol use. No history on file for drug use.    Family History  Mother with diverticulitis ( No surgery)  Father with diverticulosis     Allergies  Patient has no known allergies.     Review of Systems   Constitutional: Negative.    Eyes: Negative.    Respiratory:  "Negative.     Cardiovascular: Negative.    Gastrointestinal:  Positive for abdominal pain, diarrhea and nausea.   Endocrine: Negative.    Genitourinary: Negative.    Musculoskeletal: Negative.    Skin: Negative.    Allergic/Immunologic: Negative.    Neurological: Negative.    Hematological: Negative.    Psychiatric/Behavioral: Negative.          Physical Exam  Constitutional:       Appearance: Normal appearance.   HENT:      Head: Normocephalic and atraumatic.      Nose: Nose normal.      Mouth/Throat:      Mouth: Mucous membranes are moist.   Eyes:      Extraocular Movements: Extraocular movements intact.      Conjunctiva/sclera: Conjunctivae normal.      Pupils: Pupils are equal, round, and reactive to light.   Cardiovascular:      Rate and Rhythm: Normal rate and regular rhythm.   Pulmonary:      Effort: Pulmonary effort is normal.      Breath sounds: Normal breath sounds.   Abdominal:      Palpations: Abdomen is soft.      Tenderness: There is abdominal tenderness.      Comments: Good bowel sounds.  Tender LLQ and mid lower quadrant. No guarding or rebound.   Musculoskeletal:         General: Normal range of motion.      Cervical back: Normal range of motion and neck supple.   Skin:     General: Skin is warm and dry.   Neurological:      General: No focal deficit present.      Mental Status: She is oriented to person, place, and time.   Psychiatric:         Mood and Affect: Mood normal.         Behavior: Behavior normal.          Last Recorded Vitals  Blood pressure 118/67, pulse 70, temperature 36.2 °C (97.2 °F), temperature source Temporal, resp. rate 16, height 1.626 m (5' 4\"), weight 69.9 kg (154 lb 1.6 oz), SpO2 97 %.    Relevant Results    CT abdomen pelvis w IV contrast    Result Date: 11/7/2023  Interpreted By:  Rachael Pedroza, STUDY: CT ABDOMEN PELVIS W IV CONTRAST; 11/7/2023 6:08 pm   INDICATION: Signs/Symptoms:llq pain. with po contrast;   COMPARISON: February 2022   ACCESSION NUMBER(S): " [id number]   ORDERING CLINICIAN: ADRIANA SAHA   TECHNIQUE: Contiguous axial images of the abdomen/pelvis were performed with IV contrast. 75 ml of Omnipaque 350 was utilized. All CT examinations are performed with 1 or more of the following dose reduction techniques: Automated exposure control, adjustment of mA and/or kv according to patient's size, or use of iterative reconstruction techniques.   FINDINGS: There has been interval worsening and mural thickening and inflammatory changes surrounding the sigmoid colon. There has been interval increase in size of the loculated fluid collection adjacent to the sigmoid colon measuring approximately 3 x 2 cm. There is again diffuse diverticulosis.   The liver, gallbladder,  common bile duct, pancreas, spleen, and adrenal glands are unremarkable.   The kidneys enhance symmetrically.  No urolithiasis is seen. No hydroureteronephrosis is seen.   The visualized aorta is unremarkable.   The small bowel is not dilated.   No free intraperitoneal air or fluid is seen.   The bladder is decompressed.   The visualized osseous structures are intact.   Limited images of the lower thorax are unremarkable.       Interval worsening in mural thickening and inflammatory changes surrounding the sigmoid colon compatible with acute on chronic diverticulitis. Interval increase in size in a small loculated fluid collection adjacent to the sigmoid colon now measuring 3 x 2 cm.   MACRO: None   Signed by: Rachael Pedroza 11/7/2023 6:17 PM Dictation workstation:   [id number]      Results for orders placed or performed during the hospital encounter of 11/07/23 (from the past 24 hour(s))   CBC and Auto Differential   Result Value Ref Range    WBC 7.4 4.4 - 11.3 x10*3/uL    nRBC 0.0 0.0 - 0.0 /100 WBCs    RBC 4.21 4.00 - 5.20 x10*6/uL    Hemoglobin 12.4 12.0 - 16.0 g/dL    Hematocrit 36.2 36.0 - 46.0 %    MCV 86 80 - 100 fL    MCH 29.5 26.0 - 34.0 pg    MCHC 34.3 32.0 - 36.0 g/dL    RDW 12.8 11.5 -  14.5 %    Platelets 379 150 - 450 x10*3/uL    Neutrophils % 79.3 40.0 - 80.0 %    Immature Granulocytes %, Automated 0.3 0.0 - 0.9 %    Lymphocytes % 9.8 13.0 - 44.0 %    Monocytes % 8.7 2.0 - 10.0 %    Eosinophils % 1.5 0.0 - 6.0 %    Basophils % 0.4 0.0 - 2.0 %    Neutrophils Absolute 5.85 1.20 - 7.70 x10*3/uL    Immature Granulocytes Absolute, Automated 0.02 0.00 - 0.70 x10*3/uL    Lymphocytes Absolute 0.72 (L) 1.20 - 4.80 x10*3/uL    Monocytes Absolute 0.64 0.10 - 1.00 x10*3/uL    Eosinophils Absolute 0.11 0.00 - 0.70 x10*3/uL    Basophils Absolute 0.03 0.00 - 0.10 x10*3/uL   Comprehensive metabolic panel   Result Value Ref Range    Glucose 106 (H) 65 - 99 mg/dL    Sodium 134 133 - 145 mmol/L    Potassium 3.8 3.4 - 5.1 mmol/L    Chloride 97 97 - 107 mmol/L    Bicarbonate 23 (L) 24 - 31 mmol/L    Urea Nitrogen 11 8 - 25 mg/dL    Creatinine 0.80 0.40 - 1.60 mg/dL    eGFR 82 >60 mL/min/1.73m*2    Calcium 9.1 8.5 - 10.4 mg/dL    Albumin 3.8 3.5 - 5.0 g/dL    Alkaline Phosphatase 85 35 - 125 U/L    Total Protein 7.8 5.9 - 7.9 g/dL    AST 18 5 - 40 U/L    Bilirubin, Total 0.3 0.1 - 1.2 mg/dL    ALT 9 5 - 40 U/L    Anion Gap 14 <=19 mmol/L   Lipase   Result Value Ref Range    Lipase 17 16 - 63 U/L   Urinalysis with Reflex Microscopic   Result Value Ref Range    Color, Urine Colorless (N) Light-Yellow, Yellow, Dark-Yellow    Appearance, Urine Clear Clear    Specific Gravity, Urine 1.004 (N) 1.005 - 1.035    pH, Urine 7.0 5.0, 5.5, 6.0, 6.5, 7.0, 7.5, 8.0    Protein, Urine NEGATIVE NEGATIVE, 10 (TRACE), 20 (TRACE) mg/dL    Glucose, Urine Normal Normal mg/dL    Blood, Urine NEGATIVE NEGATIVE    Ketones, Urine NEGATIVE NEGATIVE mg/dL    Bilirubin, Urine NEGATIVE NEGATIVE    Urobilinogen, Urine Normal Normal mg/dL    Nitrite, Urine NEGATIVE NEGATIVE    Leukocyte Esterase, Urine NEGATIVE NEGATIVE   CBC   Result Value Ref Range    WBC 6.9 4.4 - 11.3 x10*3/uL    nRBC 0.0 0.0 - 0.0 /100 WBCs    RBC 3.81 (L) 4.00 - 5.20 x10*6/uL     Hemoglobin 11.1 (L) 12.0 - 16.0 g/dL    Hematocrit 33.2 (L) 36.0 - 46.0 %    MCV 87 80 - 100 fL    MCH 29.1 26.0 - 34.0 pg    MCHC 33.4 32.0 - 36.0 g/dL    RDW 13.0 11.5 - 14.5 %    Platelets 331 150 - 450 x10*3/uL   Basic metabolic panel   Result Value Ref Range    Glucose 108 (H) 65 - 99 mg/dL    Sodium 141 133 - 145 mmol/L    Potassium 3.9 3.4 - 5.1 mmol/L    Chloride 111 (H) 97 - 107 mmol/L    Bicarbonate 21 (L) 24 - 31 mmol/L    Urea Nitrogen 7 (L) 8 - 25 mg/dL    Creatinine 0.80 0.40 - 1.60 mg/dL    eGFR 82 >60 mL/min/1.73m*2    Calcium 8.4 (L) 8.5 - 10.4 mg/dL    Anion Gap 9 <=19 mmol/L           Assessment/Plan   Diverticulitis with abscess:  Clear liquids  Dietary consult for diverticular diet  IR consult for possible drainage   IV fluids  IV Zosyn, (cannot tolerate Cipro /Flagyl  Surgery will follow  DVT prophylaxis- holding any pharm agent now incase IR drainage. SCD's  Patient is a FULL CODE             I spent 30 minutes in the professional and overall care of this patient.      Hortensia Flores PA-C

## 2023-11-08 NOTE — PROGRESS NOTES
Ivana Funes is a 63 y.o. female on day 0 of admission presenting with Diverticulitis.      Subjective   Admitted yesterday evening for Diverticulitis with abscess. Pt endorses lower abd pain. Was constipated prior to admit but now having diarrhea. No n/v. Tolerating clear liquids.        Objective     Last Recorded Vitals  /69 (BP Location: Right arm, Patient Position: Sitting)   Pulse 71   Temp 36.3 °C (97.3 °F) (Temporal)   Resp 16   Wt 69.9 kg (154 lb 1.6 oz)   SpO2 100%   Intake/Output last 3 Shifts:    Intake/Output Summary (Last 24 hours) at 11/8/2023 1138  Last data filed at 11/8/2023 0900  Gross per 24 hour   Intake 1530 ml   Output --   Net 1530 ml           Physical Exam  HENT:      Head: Normocephalic and atraumatic.      Nose: Nose normal.      Mouth/Throat:      Mouth: Mucous membranes are moist.      Pharynx: Oropharynx is clear.   Eyes:      Extraocular Movements: Extraocular movements intact.      Pupils: Pupils are equal, round, and reactive to light.   Cardiovascular:      Rate and Rhythm: Normal rate and regular rhythm.      Pulses: Normal pulses.   Pulmonary:      Effort: Pulmonary effort is normal.      Breath sounds: Normal breath sounds.   Abdominal:      General: Abdomen is flat. Bowel sounds are normal.      Palpations: Abdomen is soft.      Tenderness: There is abdominal tenderness.   Musculoskeletal:         General: Normal range of motion.   Skin:     General: Skin is warm and dry.      Capillary Refill: Capillary refill takes less than 2 seconds.   Neurological:      General: No focal deficit present.      Mental Status: She is oriented to person, place, and time.   Psychiatric:         Mood and Affect: Mood normal.       Results for orders placed or performed during the hospital encounter of 11/07/23 (from the past 24 hour(s))   CBC and Auto Differential   Result Value Ref Range    WBC 7.4 4.4 - 11.3 x10*3/uL    nRBC 0.0 0.0 - 0.0 /100 WBCs    RBC 4.21 4.00 - 5.20  x10*6/uL    Hemoglobin 12.4 12.0 - 16.0 g/dL    Hematocrit 36.2 36.0 - 46.0 %    MCV 86 80 - 100 fL    MCH 29.5 26.0 - 34.0 pg    MCHC 34.3 32.0 - 36.0 g/dL    RDW 12.8 11.5 - 14.5 %    Platelets 379 150 - 450 x10*3/uL    Neutrophils % 79.3 40.0 - 80.0 %    Immature Granulocytes %, Automated 0.3 0.0 - 0.9 %    Lymphocytes % 9.8 13.0 - 44.0 %    Monocytes % 8.7 2.0 - 10.0 %    Eosinophils % 1.5 0.0 - 6.0 %    Basophils % 0.4 0.0 - 2.0 %    Neutrophils Absolute 5.85 1.20 - 7.70 x10*3/uL    Immature Granulocytes Absolute, Automated 0.02 0.00 - 0.70 x10*3/uL    Lymphocytes Absolute 0.72 (L) 1.20 - 4.80 x10*3/uL    Monocytes Absolute 0.64 0.10 - 1.00 x10*3/uL    Eosinophils Absolute 0.11 0.00 - 0.70 x10*3/uL    Basophils Absolute 0.03 0.00 - 0.10 x10*3/uL   Comprehensive metabolic panel   Result Value Ref Range    Glucose 106 (H) 65 - 99 mg/dL    Sodium 134 133 - 145 mmol/L    Potassium 3.8 3.4 - 5.1 mmol/L    Chloride 97 97 - 107 mmol/L    Bicarbonate 23 (L) 24 - 31 mmol/L    Urea Nitrogen 11 8 - 25 mg/dL    Creatinine 0.80 0.40 - 1.60 mg/dL    eGFR 82 >60 mL/min/1.73m*2    Calcium 9.1 8.5 - 10.4 mg/dL    Albumin 3.8 3.5 - 5.0 g/dL    Alkaline Phosphatase 85 35 - 125 U/L    Total Protein 7.8 5.9 - 7.9 g/dL    AST 18 5 - 40 U/L    Bilirubin, Total 0.3 0.1 - 1.2 mg/dL    ALT 9 5 - 40 U/L    Anion Gap 14 <=19 mmol/L   Lipase   Result Value Ref Range    Lipase 17 16 - 63 U/L   Urinalysis with Reflex Microscopic   Result Value Ref Range    Color, Urine Colorless (N) Light-Yellow, Yellow, Dark-Yellow    Appearance, Urine Clear Clear    Specific Gravity, Urine 1.004 (N) 1.005 - 1.035    pH, Urine 7.0 5.0, 5.5, 6.0, 6.5, 7.0, 7.5, 8.0    Protein, Urine NEGATIVE NEGATIVE, 10 (TRACE), 20 (TRACE) mg/dL    Glucose, Urine Normal Normal mg/dL    Blood, Urine NEGATIVE NEGATIVE    Ketones, Urine NEGATIVE NEGATIVE mg/dL    Bilirubin, Urine NEGATIVE NEGATIVE    Urobilinogen, Urine Normal Normal mg/dL    Nitrite, Urine NEGATIVE NEGATIVE     Leukocyte Esterase, Urine NEGATIVE NEGATIVE   CBC   Result Value Ref Range    WBC 6.9 4.4 - 11.3 x10*3/uL    nRBC 0.0 0.0 - 0.0 /100 WBCs    RBC 3.81 (L) 4.00 - 5.20 x10*6/uL    Hemoglobin 11.1 (L) 12.0 - 16.0 g/dL    Hematocrit 33.2 (L) 36.0 - 46.0 %    MCV 87 80 - 100 fL    MCH 29.1 26.0 - 34.0 pg    MCHC 33.4 32.0 - 36.0 g/dL    RDW 13.0 11.5 - 14.5 %    Platelets 331 150 - 450 x10*3/uL   Basic metabolic panel   Result Value Ref Range    Glucose 108 (H) 65 - 99 mg/dL    Sodium 141 133 - 145 mmol/L    Potassium 3.9 3.4 - 5.1 mmol/L    Chloride 111 (H) 97 - 107 mmol/L    Bicarbonate 21 (L) 24 - 31 mmol/L    Urea Nitrogen 7 (L) 8 - 25 mg/dL    Creatinine 0.80 0.40 - 1.60 mg/dL    eGFR 82 >60 mL/min/1.73m*2    Calcium 8.4 (L) 8.5 - 10.4 mg/dL    Anion Gap 9 <=19 mmol/L           Assessment/Plan      Diverticulitis with Abscess  -Hx of diverticulitis last year, was hospitalized at [facility], she does tell me there was abscess at that time, but was too small to drain.   -General Surgery follows  -IR consulted for possible drainage   -Continue IV Zosyn. Pt unable to tolerate Flagyl/Cipro  -Clear liquids, advance per surgery   -IVF hydration      Elevated Blood Pressure without Dx HTN  -BP slightly elevated a times possibly from pain, will monitor   -Monitor     DVT Risk  -Will hold off on any pharm agent for now in case drainage of the abscess is needed.   -SCDs for now.      Plan  To home on discharge with no needs         Yessica Lara, APRN-CNP

## 2023-11-08 NOTE — CARE PLAN
The patient's goals for the shift include  pain control    The clinical goals for the shift include comfrort and pain control

## 2023-11-09 VITALS
SYSTOLIC BLOOD PRESSURE: 120 MMHG | OXYGEN SATURATION: 100 % | DIASTOLIC BLOOD PRESSURE: 70 MMHG | HEART RATE: 72 BPM | RESPIRATION RATE: 16 BRPM | BODY MASS INDEX: 26.31 KG/M2 | WEIGHT: 154.1 LBS | HEIGHT: 64 IN | TEMPERATURE: 97.5 F

## 2023-11-09 LAB
ANION GAP SERPL CALC-SCNC: 10 MMOL/L
BUN SERPL-MCNC: <3 MG/DL (ref 8–25)
CALCIUM SERPL-MCNC: 8.3 MG/DL (ref 8.5–10.4)
CHLORIDE SERPL-SCNC: 109 MMOL/L (ref 97–107)
CO2 SERPL-SCNC: 21 MMOL/L (ref 24–31)
CREAT SERPL-MCNC: 0.7 MG/DL (ref 0.4–1.6)
ERYTHROCYTE [DISTWIDTH] IN BLOOD BY AUTOMATED COUNT: 12.7 % (ref 11.5–14.5)
GFR SERPL CREATININE-BSD FRML MDRD: >90 ML/MIN/1.73M*2
GLUCOSE SERPL-MCNC: 100 MG/DL (ref 65–99)
HCT VFR BLD AUTO: 32.5 % (ref 36–46)
HGB BLD-MCNC: 10.8 G/DL (ref 12–16)
MCH RBC QN AUTO: 28.8 PG (ref 26–34)
MCHC RBC AUTO-ENTMCNC: 33.2 G/DL (ref 32–36)
MCV RBC AUTO: 87 FL (ref 80–100)
NRBC BLD-RTO: 0 /100 WBCS (ref 0–0)
PLATELET # BLD AUTO: 331 X10*3/UL (ref 150–450)
POTASSIUM SERPL-SCNC: 3.5 MMOL/L (ref 3.4–5.1)
RBC # BLD AUTO: 3.75 X10*6/UL (ref 4–5.2)
SODIUM SERPL-SCNC: 140 MMOL/L (ref 133–145)
WBC # BLD AUTO: 6.3 X10*3/UL (ref 4.4–11.3)

## 2023-11-09 PROCEDURE — 36415 COLL VENOUS BLD VENIPUNCTURE: CPT | Performed by: NURSE PRACTITIONER

## 2023-11-09 PROCEDURE — 85027 COMPLETE CBC AUTOMATED: CPT | Performed by: NURSE PRACTITIONER

## 2023-11-09 PROCEDURE — 2500000004 HC RX 250 GENERAL PHARMACY W/ HCPCS (ALT 636 FOR OP/ED): Performed by: NURSE PRACTITIONER

## 2023-11-09 PROCEDURE — 80048 BASIC METABOLIC PNL TOTAL CA: CPT | Performed by: NURSE PRACTITIONER

## 2023-11-09 RX ADMIN — PIPERACILLIN SODIUM AND TAZOBACTAM SODIUM 3.38 G: 3; .375 INJECTION, SOLUTION INTRAVENOUS at 05:45

## 2023-11-09 ASSESSMENT — COGNITIVE AND FUNCTIONAL STATUS - GENERAL
MOBILITY SCORE: 24
DAILY ACTIVITIY SCORE: 24

## 2023-11-09 ASSESSMENT — PAIN SCALES - GENERAL: PAINLEVEL_OUTOF10: 0 - NO PAIN

## 2023-11-09 ASSESSMENT — PAIN - FUNCTIONAL ASSESSMENT: PAIN_FUNCTIONAL_ASSESSMENT: 0-10

## 2023-11-09 NOTE — PROGRESS NOTES
"Ivana Funes is a 63 y.o. female on day 1 of admission presenting with Diverticulitis.    Subjective   Feels good and better than yesterday.  Taking her regular diet good without any issues.  No abdominal pain, nausea or vomiting since seen yesterday.  Passing gas.  Had 2 bowel movements since seen yesterday.  No chills.  Getting out of bed.  Plan is for tentative discharge today.  \"Definitely\" feels well enough for discharge today.      Objective     Vital signs in last 24 hours:  Temp:  [36.1 °C (97 °F)-37 °C (98.6 °F)] 36.4 °C (97.5 °F)  Heart Rate:  [71-74] 72  Resp:  [15-16] 16  BP: (120-148)/(68-76) 120/70  Heart Rate:  [71-74]   Temp:  [36.1 °C (97 °F)-37 °C (98.6 °F)]   Resp:  [15-16]   BP: (120-148)/(68-76)   SpO2:  [100 %]      Intake/Output last 3 Shifts:  I/O last 3 completed shifts:  In: 3230 (46.2 mL/kg) [P.O.:1000; I.V.:1880 (26.9 mL/kg); IV Piggyback:350]  Out: - (0 mL/kg)   Weight: 69.9 kg     Physical Exam  In chair  Looks great  No acute distress  Not septic appearing  Looks fine and comfortable  Alert and oriented  Heart regular  Lungs clear  No edema  Abdomen soft, positive bowel sounds, nondistended, nontender    Scheduled medications  acetaminophen, 650 mg, oral, Once  piperacillin-tazobactam, 3.375 g, intravenous, q6h      Continuous medications  sodium chloride 0.9%, 75 mL/hr, Last Rate: 75 mL/hr (11/08/23 2042)      PRN medications  PRN medications: acetaminophen **OR** acetaminophen **OR** acetaminophen, ondansetron ODT **OR** ondansetron, polyethylene glycol, traMADol    Relevant Results  Results from last 7 days   Lab Units 11/09/23  0613 11/08/23  0556 11/07/23  1643   WBC AUTO x10*3/uL 6.3 6.9 7.4   HEMOGLOBIN g/dL 10.8* 11.1* 12.4   HEMATOCRIT % 32.5* 33.2* 36.2   PLATELETS AUTO x10*3/uL 331 331 379      Results from last 7 days   Lab Units 11/09/23  0613 11/08/23  0556 11/07/23  1643   SODIUM mmol/L 140 141 134   POTASSIUM mmol/L 3.5 3.9 3.8   CHLORIDE mmol/L 109* 111* 97   CO2 " mmol/L 21* 21* 23*   BUN mg/dL <3* 7* 11   CREATININE mg/dL 0.70 0.80 0.80   GLUCOSE mg/dL 100* 108* 106*   CALCIUM mg/dL 8.3* 8.4* 9.1      CT abdomen pelvis w IV contrast  Result Date: 11/7/2023  Interpreted By:  Rachael Pedroza, STUDY: CT ABDOMEN PELVIS W IV CONTRAST; 11/7/2023 6:08 pm   INDICATION: Signs/Symptoms:llq pain. with po contrast;   COMPARISON: February 2022   ACCESSION NUMBER(S): [id number]   ORDERING CLINICIAN: ADRIANA SAHA   TECHNIQUE: Contiguous axial images of the abdomen/pelvis were performed with IV contrast. 75 ml of Omnipaque 350 was utilized. All CT examinations are performed with 1 or more of the following dose reduction techniques: Automated exposure control, adjustment of mA and/or kv according to patient's size, or use of iterative reconstruction techniques.   FINDINGS: There has been interval worsening and mural thickening and inflammatory changes surrounding the sigmoid colon. There has been interval increase in size of the loculated fluid collection adjacent to the sigmoid colon measuring approximately 3 x 2 cm. There is again diffuse diverticulosis.   The liver, gallbladder,  common bile duct, pancreas, spleen, and adrenal glands are unremarkable.   The kidneys enhance symmetrically.  No urolithiasis is seen. No hydroureteronephrosis is seen.   The visualized aorta is unremarkable.   The small bowel is not dilated.   No free intraperitoneal air or fluid is seen.   The bladder is decompressed.   The visualized osseous structures are intact.   Limited images of the lower thorax are unremarkable.       Interval worsening in mural thickening and inflammatory changes surrounding the sigmoid colon compatible with acute on chronic diverticulitis. Interval increase in size in a small loculated fluid collection adjacent to the sigmoid colon now measuring 3 x 2 cm.   MACRO: None   Signed by: Rachael Pedroza 11/7/2023 6:17 PM Dictation workstation:   [id number]       Assessment/Plan   Acute  on chronic diverticulitis with small loculated fluid collection adjacent to the sigmoid colon now measuring 3 x 2 cm  Stable and improving  Antibiotics per IM-on Zosyn  Is on a regular diet  IR was consulted-there are no IR notes-patient states that PA told her yesterday that IR stated above fluid collection is too small to drain  No acute surgical indications at present  Surgeon will see patient today as well    DVT prophylaxis-Per IM    Zaria Gandara, APRN-CNP

## 2023-11-09 NOTE — CARE PLAN
The patient's goals for the shift include  to be discharged home     The clinical goals for the shift include comfort

## 2023-11-09 NOTE — CARE PLAN
The patient's goals for the shift include  go home    The clinical goals for the shift include comfort, discharge home    No barriers to meeting these goals.       Problem: Pain  Goal: My pain/discomfort is manageable  Outcome: Adequate for Discharge     Problem: Safety  Goal: Patient will be injury free during hospitalization  Outcome: Adequate for Discharge  Goal: I will remain free of falls  Outcome: Adequate for Discharge     Problem: Daily Care  Goal: Daily care needs are met  Outcome: Adequate for Discharge     Problem: Pain  Goal: Takes deep breaths with improved pain control throughout the shift  Outcome: Adequate for Discharge  Goal: Walks with improved pain control throughout the shift  Outcome: Adequate for Discharge  Goal: Free from opioid side effects throughout the shift  Outcome: Adequate for Discharge

## 2023-11-09 NOTE — DISCHARGE SUMMARY
Discharge Diagnosis  Diverticulitis with abscess    Issues Requiring Follow-Up  Diverticular abscess    Discharge Meds     Your medication list        CONTINUE taking these medications        Instructions Last Dose Given Next Dose Due   amoxicillin-pot clavulanate 875-125 mg tablet  Commonly known as: Augmentin      Take 1 tablet (875 mg) by mouth 2 times a day for 10 days.                Test Results Pending At Discharge  Pending Labs       No current pending labs.            Hospital Course   Admitted for Diverticulitis with abscess. Pt was placed on bowel rest and treated with IV Abx and IV fluids. She quickly improved. Interventional Radiology was consulted for possible drainage of abscess and it was felt that abscess was too small to drain. Pt is tolerating diet and is requesting discharge. Pain is resolved. No fever or leukocytosis. Is unable to tolerate oral Cipro/Flagyl. To continue taking Augmentin. Pt picked up this prescription day prior to admit and has full prescription. Medically stable for discharge. To follow up with General Surgery.     Pertinent Physical Exam At Time of Discharge  Physical Exam  HENT:      Head: Normocephalic and atraumatic.      Nose: Nose normal.      Mouth/Throat:      Mouth: Mucous membranes are moist.      Pharynx: Oropharynx is clear.   Eyes:      Extraocular Movements: Extraocular movements intact.      Pupils: Pupils are equal, round, and reactive to light.   Cardiovascular:      Rate and Rhythm: Normal rate and regular rhythm.      Pulses: Normal pulses.   Pulmonary:      Effort: Pulmonary effort is normal.      Breath sounds: Normal breath sounds.   Abdominal:      General: Abdomen is flat. Bowel sounds are normal.      Palpations: Abdomen is soft.   Musculoskeletal:         General: Normal range of motion.   Skin:     General: Skin is warm and dry.      Capillary Refill: Capillary refill takes less than 2 seconds.   Neurological:      General: No focal deficit present.       Mental Status: She is oriented to person, place, and time.   Psychiatric:         Mood and Affect: Mood normal.         Outpatient Follow-Up  No future appointments.      Yessica Lara, APRN-CNP

## 2023-11-16 ENCOUNTER — TELEPHONE (OUTPATIENT)
Dept: INPATIENT UNIT | Facility: HOSPITAL | Age: 63
End: 2023-11-16

## 2023-11-22 ENCOUNTER — OFFICE VISIT (OUTPATIENT)
Dept: SURGERY | Facility: CLINIC | Age: 63
End: 2023-11-22
Payer: COMMERCIAL

## 2023-11-22 VITALS
WEIGHT: 152 LBS | HEART RATE: 73 BPM | SYSTOLIC BLOOD PRESSURE: 128 MMHG | BODY MASS INDEX: 25.95 KG/M2 | HEIGHT: 64 IN | DIASTOLIC BLOOD PRESSURE: 84 MMHG

## 2023-11-22 DIAGNOSIS — K57.20 DIVERTICULITIS OF LARGE INTESTINE WITH ABSCESS WITHOUT BLEEDING: Primary | ICD-10-CM

## 2023-11-22 PROBLEM — K57.92 DIVERTICULITIS: Status: RESOLVED | Noted: 2023-11-07 | Resolved: 2023-11-22

## 2023-11-22 PROCEDURE — 99214 OFFICE O/P EST MOD 30 MIN: CPT | Performed by: STUDENT IN AN ORGANIZED HEALTH CARE EDUCATION/TRAINING PROGRAM

## 2023-11-22 PROCEDURE — 1036F TOBACCO NON-USER: CPT | Performed by: STUDENT IN AN ORGANIZED HEALTH CARE EDUCATION/TRAINING PROGRAM

## 2023-11-22 PROCEDURE — 99204 OFFICE O/P NEW MOD 45 MIN: CPT | Performed by: STUDENT IN AN ORGANIZED HEALTH CARE EDUCATION/TRAINING PROGRAM

## 2023-11-22 RX ORDER — HEPARIN SODIUM 5000 [USP'U]/ML
5000 INJECTION, SOLUTION INTRAVENOUS; SUBCUTANEOUS ONCE
Status: CANCELLED | OUTPATIENT
Start: 2023-11-22 | End: 2023-11-22

## 2023-11-22 RX ORDER — ACETAMINOPHEN 500 MG
1000 TABLET ORAL ONCE
Status: CANCELLED | OUTPATIENT
Start: 2023-11-22 | End: 2023-11-22

## 2023-11-22 RX ORDER — METRONIDAZOLE 500 MG/100ML
500 INJECTION, SOLUTION INTRAVENOUS ONCE
Status: CANCELLED | OUTPATIENT
Start: 2023-11-22 | End: 2023-11-22

## 2023-11-22 ASSESSMENT — ENCOUNTER SYMPTOMS
LOSS OF SENSATION IN FEET: 0
UNEXPECTED WEIGHT CHANGE: 0
BLOOD IN STOOL: 0
HEMATURIA: 0
WOUND: 0
DEPRESSION: 0
CHEST TIGHTNESS: 0
TROUBLE SWALLOWING: 0
OCCASIONAL FEELINGS OF UNSTEADINESS: 0
SPEECH DIFFICULTY: 0
ARTHRALGIAS: 0
BRUISES/BLEEDS EASILY: 0
SHORTNESS OF BREATH: 0
NAUSEA: 0
VOMITING: 0
VOICE CHANGE: 0
FEVER: 0
HEADACHES: 0
DIARRHEA: 0
PALPITATIONS: 0
CHILLS: 0
ABDOMINAL PAIN: 0
FACIAL ASYMMETRY: 0
DYSURIA: 0
SORE THROAT: 0
ADENOPATHY: 0

## 2023-11-22 ASSESSMENT — PAIN SCALES - GENERAL: PAINLEVEL: 0-NO PAIN

## 2023-11-22 ASSESSMENT — PATIENT HEALTH QUESTIONNAIRE - PHQ9
1. LITTLE INTEREST OR PLEASURE IN DOING THINGS: NOT AT ALL
SUM OF ALL RESPONSES TO PHQ9 QUESTIONS 1 & 2: 0
2. FEELING DOWN, DEPRESSED OR HOPELESS: NOT AT ALL

## 2023-11-22 NOTE — PROGRESS NOTES
History Of Present Illness  Ivana Funes is a 63 y.o. female presenting for evaluation of recurrent diverticulitis.  She had 1 bout last year that was treated with antibiotics.  She underwent a subsequent colonoscopy which just showed diverticula and no underlying polyp or mass.  At the beginning of November she developed recurrent symptoms of pain and constipation and was given p.o. antibiotics, however the pain persisted and she ended up being admitted for several days at [facility] for IV antibiotics.  Imaging showed an abscess that was too small to be drained.  She improved and was discharged with p.o. antibiotics.  She now feels back to normal with no pain.  She is tolerating diet without any nausea or vomiting and having normal bowel movements.     Past Medical History  Medical History        Past Medical History:   Diagnosis Date    Colon polyp      Diverticulitis of intestine with abscess      Flushing       Hot flashes            Surgical History  Surgical History         Past Surgical History:   Procedure Laterality Date    COLONOSCOPY   08/22/2022    OOPHORECTOMY   04/10/2014     Oophorectomy    OOPHORECTOMY Bilateral              Social History  She reports that she has quit smoking. Her smoking use included cigarettes. She has a 1.25 pack-year smoking history. She has never used smokeless tobacco. She reports current alcohol use. She reports that she does not use drugs.     Family History  Family History          Family History   Problem Relation Name Age of Onset    Diabetes Mother Sabiha      Hypertension Mother Sabiha      Hypertension Father        Hypertension Sister        Diabetes Brother Zach              Allergies  Patient has no known allergies.     Review of Systems   Constitutional:  Negative for chills, fever and unexpected weight change.   HENT:  Negative for sneezing, sore throat, trouble swallowing and voice change.    Respiratory:  Negative for chest tightness and shortness of breath.   "  Cardiovascular:  Negative for chest pain and palpitations.   Gastrointestinal:  Negative for abdominal pain, blood in stool, diarrhea, nausea and vomiting.   Endocrine: Negative for cold intolerance and heat intolerance.   Genitourinary:  Negative for decreased urine volume, dysuria and hematuria.   Musculoskeletal:  Negative for arthralgias and gait problem.   Skin:  Negative for rash and wound.   Neurological:  Negative for facial asymmetry, speech difficulty and headaches.   Hematological:  Negative for adenopathy. Does not bruise/bleed easily.   Psychiatric/Behavioral:  Negative for self-injury and suicidal ideas.          Physical Exam  Vitals and nursing note reviewed.   Constitutional:       Appearance: Normal appearance.   HENT:      Head: Normocephalic and atraumatic.      Mouth/Throat:      Mouth: Mucous membranes are moist.      Pharynx: Oropharynx is clear.   Eyes:      Extraocular Movements: Extraocular movements intact.      Pupils: Pupils are equal, round, and reactive to light.   Cardiovascular:      Rate and Rhythm: Normal rate and regular rhythm.      Pulses: Normal pulses.   Pulmonary:      Effort: Pulmonary effort is normal.      Breath sounds: Normal breath sounds.   Abdominal:      General: There is no distension.      Palpations: Abdomen is soft.      Tenderness: There is no abdominal tenderness.      Comments: Small supraumbilical well-healed incision   Musculoskeletal:      Cervical back: Normal range of motion and neck supple.   Skin:     General: Skin is warm and dry.   Neurological:      General: No focal deficit present.      Mental Status: She is alert and oriented to person, place, and time.   Psychiatric:         Mood and Affect: Mood normal.         Behavior: Behavior normal.         Last Recorded Vitals  Blood pressure 128/84, pulse 73, height 1.626 m (5' 4\"), weight 68.9 kg (152 lb).     Relevant Results  CT scan from last year and this year reviewed-sigmoid diverticulitis with " small pericolonic abscess     Assessment/Plan   Problem List Items Addressed This Visit               ICD-10-CM             Gastrointestinal and Abdominal     Diverticulitis of large intestine with abscess without bleeding - Primary K57.20     Relevant Orders     Case Request Operating Room: Resection Laparoscopy Sigmoid Colon and Rectum (Completed)   We reviewed her 2 hospital admissions with diverticulitis and small pericolonic abscess.  Given the fact that she had abscesses in both times and had multiple bouts, she is likely to have another episode of diverticulitis at some point and I have offered a sigmoid colectomy.  Both episodes of diverticulitis originated from the sigmoid colon in similar spots.  She had a recent colonoscopy which did not show any underlying mass or polyp.  Discussed the procedure in detail including risk benefits alternatives, and discussed the expected postsurgical course.  All of her questions were answered.  I recommended that surgery be performed when there is no current infection to increase her chances for an anastomosis and avoid a temporary ostomy.  She wants to further discuss with her  and figure out timing with work.  She will call back when she is ready to schedule surgery.  Instructed her to return to the ER if she has any recurrent symptoms of abdominal pain nausea constipation or fevers.     Rosette Madera MD

## 2023-11-22 NOTE — H&P
History Of Present Illness  Ivana Funes is a 63 y.o. female presenting for evaluation of recurrent diverticulitis.  She had 1 bout last year that was treated with antibiotics.  She underwent a subsequent colonoscopy which just showed diverticula and no underlying polyp or mass.  At the beginning of November she developed recurrent symptoms of pain and constipation and was given p.o. antibiotics, however the pain persisted and she ended up being admitted for several days at [facility] for IV antibiotics.  Imaging showed an abscess that was too small to be drained.  She improved and was discharged with p.o. antibiotics.  She now feels back to normal with no pain.  She is tolerating diet without any nausea or vomiting and having normal bowel movements.     Past Medical History  Past Medical History:   Diagnosis Date    Colon polyp     Diverticulitis of intestine with abscess     Flushing     Hot flashes       Surgical History  Past Surgical History:   Procedure Laterality Date    COLONOSCOPY  08/22/2022    OOPHORECTOMY  04/10/2014    Oophorectomy    OOPHORECTOMY Bilateral         Social History  She reports that she has quit smoking. Her smoking use included cigarettes. She has a 1.25 pack-year smoking history. She has never used smokeless tobacco. She reports current alcohol use. She reports that she does not use drugs.    Family History  Family History   Problem Relation Name Age of Onset    Diabetes Mother Sabiha     Hypertension Mother Sabiha     Hypertension Father      Hypertension Sister      Diabetes Brother Zach         Allergies  Patient has no known allergies.    Review of Systems   Constitutional:  Negative for chills, fever and unexpected weight change.   HENT:  Negative for sneezing, sore throat, trouble swallowing and voice change.    Respiratory:  Negative for chest tightness and shortness of breath.    Cardiovascular:  Negative for chest pain and palpitations.   Gastrointestinal:  Negative for  "abdominal pain, blood in stool, diarrhea, nausea and vomiting.   Endocrine: Negative for cold intolerance and heat intolerance.   Genitourinary:  Negative for decreased urine volume, dysuria and hematuria.   Musculoskeletal:  Negative for arthralgias and gait problem.   Skin:  Negative for rash and wound.   Neurological:  Negative for facial asymmetry, speech difficulty and headaches.   Hematological:  Negative for adenopathy. Does not bruise/bleed easily.   Psychiatric/Behavioral:  Negative for self-injury and suicidal ideas.         Physical Exam  Vitals and nursing note reviewed.   Constitutional:       Appearance: Normal appearance.   HENT:      Head: Normocephalic and atraumatic.      Mouth/Throat:      Mouth: Mucous membranes are moist.      Pharynx: Oropharynx is clear.   Eyes:      Extraocular Movements: Extraocular movements intact.      Pupils: Pupils are equal, round, and reactive to light.   Cardiovascular:      Rate and Rhythm: Normal rate and regular rhythm.      Pulses: Normal pulses.   Pulmonary:      Effort: Pulmonary effort is normal.      Breath sounds: Normal breath sounds.   Abdominal:      General: There is no distension.      Palpations: Abdomen is soft.      Tenderness: There is no abdominal tenderness.      Comments: Small supraumbilical well-healed incision   Musculoskeletal:      Cervical back: Normal range of motion and neck supple.   Skin:     General: Skin is warm and dry.   Neurological:      General: No focal deficit present.      Mental Status: She is alert and oriented to person, place, and time.   Psychiatric:         Mood and Affect: Mood normal.         Behavior: Behavior normal.          Last Recorded Vitals  Blood pressure 128/84, pulse 73, height 1.626 m (5' 4\"), weight 68.9 kg (152 lb).    Relevant Results  CT scan from last year and this year reviewed-sigmoid diverticulitis with small pericolonic abscess     Assessment/Plan   Problem List Items Addressed This Visit           "   ICD-10-CM       Gastrointestinal and Abdominal    Diverticulitis of large intestine with abscess without bleeding - Primary K57.20     We reviewed her 2 hospital admissions with diverticulitis and small pericolonic abscess.  Given the fact that she had abscesses in both times and had multiple bouts, she is likely to have another episode of diverticulitis at some point and I have offered a sigmoid colectomy.  Both episodes of diverticulitis originated from the sigmoid colon in similar spots.  She had a recent colonoscopy which did not show any underlying mass or polyp.  Discussed the procedure in detail including risk benefits alternatives, and discussed the expected postsurgical course.  All of her questions were answered.  I recommended that surgery be performed when there is no current infection to increase her chances for an anastomosis and avoid a temporary ostomy.  She wants to further discuss with her  and figure out timing with work.  She will call back when she is ready to schedule surgery.  Instructed her to return to the ER if she has any recurrent symptoms of abdominal pain nausea constipation or fevers.    Rosette Madera MD

## 2023-11-30 ENCOUNTER — APPOINTMENT (OUTPATIENT)
Dept: SURGERY | Facility: CLINIC | Age: 63
End: 2023-11-30
Payer: COMMERCIAL

## 2024-01-16 ENCOUNTER — TELEPHONE (OUTPATIENT)
Dept: SURGERY | Facility: CLINIC | Age: 64
End: 2024-01-16

## 2024-01-16 NOTE — TELEPHONE ENCOUNTER
Patient contacted pertaining to scheduling surgery with Dr Madera. Case request has been placed. In 's previous note, it states patient to review timing with  and call office back when ready to schedule. No answer at mobile number listed, left voicemail requesting call back to office.

## 2024-01-31 NOTE — TELEPHONE ENCOUNTER
Tried to contact patient regarding scheduling surgery with Dr Madera. No answer at mobile number listed, left voicemail requesting call back to office.

## 2024-11-04 ENCOUNTER — TELEPHONE (OUTPATIENT)
Dept: GASTROENTEROLOGY | Facility: CLINIC | Age: 64
End: 2024-11-04
Payer: COMMERCIAL

## 2024-11-04 DIAGNOSIS — K57.92 ACUTE DIVERTICULITIS: Primary | ICD-10-CM

## 2024-12-10 NOTE — PROGRESS NOTES
TERESA Funes is a 64 y.o. female with a hx of HLD,  who has had multiple episodes of diverticulitis, with one hospitalization at [facility] 11/7/23-11/9/23, where diverticulitis with abscess was noted. She was evaluated in office by Dr. Madera 11/23/23, where she was offered a sigmoid colectomy procedure. She wanted to discuss with her family before making a decision.  She presents today to discuss diverticulitis.     She reports she has a hx of diverticulitis, that developed around 2022. She had two admissions for diverticulitis. She trialed oral antibiotics x2 weeks and they did not resolve symptoms. She was seen in the ED for IV antibiotics, which resolved symptoms. In 12/2023 she had another episode and was seen in [facility] ED for IV antibiotics. She was seen by a [facility]S surgeon at [facility] who recommended surgery. She was not agreeable to surgery at the time and wanted to get a second opinion a year ago. She denies any diverticulitis episodes since a year ago. She denies taking any antibiotics for diverticulitis. She denies abdominal pain. Normal appetite. Denies nausea or vomiting. She has a soft to hard bm every other day. She denies hematochezia or melena. She sometimes strains with bms. She denies dysuria, pneumaturia, hematuria, or fecaluria. She denies air or stool per vagina. She denies unexplained weight loss. She denies bulging from vagina with bms. She describes pushing near vagina to have a bm.     CT A/P 11/7/23  Impression:   - Interval worsening in mural thickening and inflammatory changes surrounding the sigmoid colon compatible with acute on chronic diverticulitis. Interval increase in size in a small loculated fluid collection adjacent to the sigmoid colon now measuring 3 x 2 cm.    CT A/P 2/4/22  Impression:   1.  Continued inflammatory changes of the sigmoid colon similar or marginally improved. No new findings    Colonoscopy 8/22/22 ([facility]):  Impression:   - Diverticulosis in the entire  colon. There was stricturing of the sigmoid colon in association with the diverticular opening which could only be traversed after changing to an upper endoscope. There was evidence of an impacted diverticulum.   - Internal hemorrhoids.   - No specimens collected.   - Repeat colonoscopy in 10 years for screening purposes.     Former smoker(quit 10 years ago)/Daily ETOH/No Illicit drug use  PMH: HDL, Hemorrhoids, IBS-C,   PSH: Transverse Ovarian resection,   No family history of CRC or IBD  Employment: IT    Past Medical History:   Diagnosis Date    Colon polyp     Diverticulitis of intestine with abscess     Flushing     Hot flashes       Past Surgical History:   Procedure Laterality Date    COLONOSCOPY  08/22/2022    OOPHORECTOMY  04/10/2014    Oophorectomy    OOPHORECTOMY Bilateral        No Known Allergies    Review of Systems   Constitutional:  Negative for activity change, appetite change, chills, diaphoresis, fatigue, fever and unexpected weight change.   Respiratory:  Negative for chest tightness and shortness of breath.    Cardiovascular:  Negative for chest pain, palpitations and leg swelling.   Gastrointestinal:  Negative for abdominal distention, abdominal pain, anal bleeding, blood in stool, constipation, diarrhea, nausea, rectal pain and vomiting.   Endocrine: Negative for polydipsia, polyphagia and polyuria.   Genitourinary:  Negative for dysuria and hematuria.   Neurological:  Negative for dizziness, seizures and light-headedness.   Hematological: Negative.        Physical Exam  Constitutional:       General: She is not in acute distress.     Appearance: Normal appearance. She is not ill-appearing.   HENT:      Head: Normocephalic.      Mouth/Throat:      Mouth: Mucous membranes are moist.   Eyes:      Extraocular Movements: Extraocular movements intact.      Pupils: Pupils are equal, round, and reactive to light.   Cardiovascular:      Rate and Rhythm: Normal rate and regular rhythm.      Pulses:  Normal pulses.      Heart sounds: Normal heart sounds. No murmur heard.  Pulmonary:      Effort: Pulmonary effort is normal. No respiratory distress.      Breath sounds: Normal breath sounds. No wheezing, rhonchi or rales.   Chest:      Chest wall: No tenderness.   Abdominal:      General: There is no distension.      Palpations: There is no mass.      Tenderness: There is no abdominal tenderness. There is no guarding or rebound.      Hernia: No hernia is present.   Musculoskeletal:         General: No swelling or deformity.      Cervical back: Neck supple. No rigidity.      Right lower leg: No edema.      Left lower leg: No edema.   Skin:     General: Skin is warm.      Coloration: Skin is not jaundiced or pale.   Neurological:      General: No focal deficit present.      Mental Status: She is alert and oriented to person, place, and time. Mental status is at baseline.   Psychiatric:         Mood and Affect: Mood normal.         Behavior: Behavior normal.         Thought Content: Thought content normal.         Judgment: Judgment normal.         Assessment and Plan:   #Sigmoid diverticulitis complicated by pericolonic abscess 2023, mild narrowing of sigmoid colon noted on colonoscopy from 2022  -  Agree with Dr. Madera's recommendation for elective sigmoid resection  -  R/B/A discussed with patient including risks of bleeding, infection, injury to structures, anastomotic complications such as leak, possibility of conversion to open approach  -  Patient wants to think about her options again  -  Can follow-up with Dr. Cassy Horowitz MD   12/11/2024  10:56 AM

## 2024-12-11 ENCOUNTER — OFFICE VISIT (OUTPATIENT)
Dept: SURGERY | Facility: CLINIC | Age: 64
End: 2024-12-11
Payer: COMMERCIAL

## 2024-12-11 VITALS
SYSTOLIC BLOOD PRESSURE: 159 MMHG | DIASTOLIC BLOOD PRESSURE: 87 MMHG | HEART RATE: 76 BPM | WEIGHT: 157 LBS | BODY MASS INDEX: 26.8 KG/M2 | HEIGHT: 64 IN | TEMPERATURE: 98.2 F

## 2024-12-11 DIAGNOSIS — K57.92 ACUTE DIVERTICULITIS: ICD-10-CM

## 2024-12-11 PROCEDURE — 1036F TOBACCO NON-USER: CPT | Performed by: STUDENT IN AN ORGANIZED HEALTH CARE EDUCATION/TRAINING PROGRAM

## 2024-12-11 PROCEDURE — 99215 OFFICE O/P EST HI 40 MIN: CPT | Performed by: STUDENT IN AN ORGANIZED HEALTH CARE EDUCATION/TRAINING PROGRAM

## 2024-12-11 PROCEDURE — 1159F MED LIST DOCD IN RCRD: CPT | Performed by: STUDENT IN AN ORGANIZED HEALTH CARE EDUCATION/TRAINING PROGRAM

## 2024-12-11 PROCEDURE — 3008F BODY MASS INDEX DOCD: CPT | Performed by: STUDENT IN AN ORGANIZED HEALTH CARE EDUCATION/TRAINING PROGRAM

## 2024-12-11 ASSESSMENT — ENCOUNTER SYMPTOMS
DIARRHEA: 0
UNEXPECTED WEIGHT CHANGE: 0
CHILLS: 0
HEMATOLOGIC/LYMPHATIC NEGATIVE: 1
DIAPHORESIS: 0
BLOOD IN STOOL: 0
CHEST TIGHTNESS: 0
FEVER: 0
PALPITATIONS: 0
SHORTNESS OF BREATH: 0
APPETITE CHANGE: 0
POLYPHAGIA: 0
ACTIVITY CHANGE: 0
HEMATURIA: 0
RECTAL PAIN: 0
CONSTIPATION: 0
ABDOMINAL PAIN: 0
DIZZINESS: 0
FATIGUE: 0
POLYDIPSIA: 0
VOMITING: 0
LIGHT-HEADEDNESS: 0
DYSURIA: 0
ANAL BLEEDING: 0
ABDOMINAL DISTENTION: 0
NAUSEA: 0
SEIZURES: 0

## 2025-01-01 ENCOUNTER — HOSPITAL ENCOUNTER (INPATIENT)
Facility: HOSPITAL | Age: 65
LOS: 2 days | Discharge: HOME | DRG: 392 | End: 2025-01-03
Attending: EMERGENCY MEDICINE | Admitting: SURGERY
Payer: COMMERCIAL

## 2025-01-01 ENCOUNTER — APPOINTMENT (OUTPATIENT)
Dept: RADIOLOGY | Facility: HOSPITAL | Age: 65
DRG: 392 | End: 2025-01-01
Payer: COMMERCIAL

## 2025-01-01 DIAGNOSIS — K57.92 DIVERTICULITIS: ICD-10-CM

## 2025-01-01 DIAGNOSIS — K57.32 DIVERTICULITIS OF LARGE INTESTINE WITHOUT BLEEDING, UNSPECIFIED COMPLICATION STATUS: ICD-10-CM

## 2025-01-01 DIAGNOSIS — R10.32 LEFT LOWER QUADRANT ABDOMINAL PAIN: ICD-10-CM

## 2025-01-01 DIAGNOSIS — N73.9 PELVIC ABSCESS IN FEMALE: Primary | ICD-10-CM

## 2025-01-01 LAB
ALBUMIN SERPL BCP-MCNC: 3.5 G/DL (ref 3.4–5)
ALP SERPL-CCNC: 65 U/L (ref 33–136)
ALT SERPL W P-5'-P-CCNC: 30 U/L (ref 7–45)
ANION GAP SERPL CALCULATED.3IONS-SCNC: 13 MMOL/L (ref 10–20)
APPEARANCE UR: CLEAR
AST SERPL W P-5'-P-CCNC: 56 U/L (ref 9–39)
BASOPHILS # BLD AUTO: 0.05 X10*3/UL (ref 0–0.1)
BASOPHILS NFR BLD AUTO: 0.6 %
BILIRUB SERPL-MCNC: 0.3 MG/DL (ref 0–1.2)
BILIRUB UR STRIP.AUTO-MCNC: NEGATIVE MG/DL
BUN SERPL-MCNC: 15 MG/DL (ref 6–23)
CALCIUM SERPL-MCNC: 8.7 MG/DL (ref 8.6–10.3)
CHLORIDE SERPL-SCNC: 105 MMOL/L (ref 98–107)
CO2 SERPL-SCNC: 21 MMOL/L (ref 21–32)
COLOR UR: YELLOW
CREAT SERPL-MCNC: 0.86 MG/DL (ref 0.5–1.05)
EGFRCR SERPLBLD CKD-EPI 2021: 75 ML/MIN/1.73M*2
EOSINOPHIL # BLD AUTO: 0.21 X10*3/UL (ref 0–0.7)
EOSINOPHIL NFR BLD AUTO: 2.4 %
ERYTHROCYTE [DISTWIDTH] IN BLOOD BY AUTOMATED COUNT: 12.5 % (ref 11.5–14.5)
GLUCOSE SERPL-MCNC: 102 MG/DL (ref 74–99)
GLUCOSE UR STRIP.AUTO-MCNC: NORMAL MG/DL
HCT VFR BLD AUTO: 36.2 % (ref 36–46)
HGB BLD-MCNC: 12.3 G/DL (ref 12–16)
HOLD SPECIMEN: NORMAL
IMM GRANULOCYTES # BLD AUTO: 0.11 X10*3/UL (ref 0–0.7)
IMM GRANULOCYTES NFR BLD AUTO: 1.3 % (ref 0–0.9)
KETONES UR STRIP.AUTO-MCNC: ABNORMAL MG/DL
LEUKOCYTE ESTERASE UR QL STRIP.AUTO: NEGATIVE
LIPASE SERPL-CCNC: 39 U/L (ref 9–82)
LYMPHOCYTES # BLD AUTO: 1.29 X10*3/UL (ref 1.2–4.8)
LYMPHOCYTES NFR BLD AUTO: 14.9 %
MCH RBC QN AUTO: 28.9 PG (ref 26–34)
MCHC RBC AUTO-ENTMCNC: 34 G/DL (ref 32–36)
MCV RBC AUTO: 85 FL (ref 80–100)
MONOCYTES # BLD AUTO: 0.64 X10*3/UL (ref 0.1–1)
MONOCYTES NFR BLD AUTO: 7.4 %
MUCOUS THREADS #/AREA URNS AUTO: NORMAL /LPF
NEUTROPHILS # BLD AUTO: 6.33 X10*3/UL (ref 1.2–7.7)
NEUTROPHILS NFR BLD AUTO: 73.4 %
NITRITE UR QL STRIP.AUTO: NEGATIVE
NRBC BLD-RTO: 0 /100 WBCS (ref 0–0)
PH UR STRIP.AUTO: 6.5 [PH]
PLATELET # BLD AUTO: 504 X10*3/UL (ref 150–450)
POTASSIUM SERPL-SCNC: 3.9 MMOL/L (ref 3.5–5.3)
PROT SERPL-MCNC: 7.3 G/DL (ref 6.4–8.2)
PROT UR STRIP.AUTO-MCNC: ABNORMAL MG/DL
RBC # BLD AUTO: 4.25 X10*6/UL (ref 4–5.2)
RBC # UR STRIP.AUTO: NEGATIVE /UL
RBC #/AREA URNS AUTO: NORMAL /HPF
SODIUM SERPL-SCNC: 135 MMOL/L (ref 136–145)
SP GR UR STRIP.AUTO: >1.05
SQUAMOUS #/AREA URNS AUTO: NORMAL /HPF
UROBILINOGEN UR STRIP.AUTO-MCNC: NORMAL MG/DL
WBC # BLD AUTO: 8.6 X10*3/UL (ref 4.4–11.3)
WBC #/AREA URNS AUTO: NORMAL /HPF

## 2025-01-01 PROCEDURE — 99221 1ST HOSP IP/OBS SF/LOW 40: CPT | Performed by: NURSE PRACTITIONER

## 2025-01-01 PROCEDURE — 96361 HYDRATE IV INFUSION ADD-ON: CPT

## 2025-01-01 PROCEDURE — 83690 ASSAY OF LIPASE: CPT

## 2025-01-01 PROCEDURE — 99221 1ST HOSP IP/OBS SF/LOW 40: CPT | Performed by: SURGERY

## 2025-01-01 PROCEDURE — 74177 CT ABD & PELVIS W/CONTRAST: CPT | Performed by: RADIOLOGY

## 2025-01-01 PROCEDURE — 80053 COMPREHEN METABOLIC PANEL: CPT

## 2025-01-01 PROCEDURE — 96375 TX/PRO/DX INJ NEW DRUG ADDON: CPT

## 2025-01-01 PROCEDURE — 1100000001 HC PRIVATE ROOM DAILY

## 2025-01-01 PROCEDURE — 99285 EMERGENCY DEPT VISIT HI MDM: CPT | Mod: 25 | Performed by: EMERGENCY MEDICINE

## 2025-01-01 PROCEDURE — 2550000001 HC RX 255 CONTRASTS

## 2025-01-01 PROCEDURE — 36415 COLL VENOUS BLD VENIPUNCTURE: CPT

## 2025-01-01 PROCEDURE — 85025 COMPLETE CBC W/AUTO DIFF WBC: CPT

## 2025-01-01 PROCEDURE — 81001 URINALYSIS AUTO W/SCOPE: CPT

## 2025-01-01 PROCEDURE — 96366 THER/PROPH/DIAG IV INF ADDON: CPT

## 2025-01-01 PROCEDURE — 2500000001 HC RX 250 WO HCPCS SELF ADMINISTERED DRUGS (ALT 637 FOR MEDICARE OP): Performed by: SURGERY

## 2025-01-01 PROCEDURE — 96365 THER/PROPH/DIAG IV INF INIT: CPT

## 2025-01-01 PROCEDURE — 2500000004 HC RX 250 GENERAL PHARMACY W/ HCPCS (ALT 636 FOR OP/ED)

## 2025-01-01 PROCEDURE — 2500000004 HC RX 250 GENERAL PHARMACY W/ HCPCS (ALT 636 FOR OP/ED): Performed by: SURGERY

## 2025-01-01 PROCEDURE — 74177 CT ABD & PELVIS W/CONTRAST: CPT

## 2025-01-01 RX ORDER — MORPHINE SULFATE 4 MG/ML
4 INJECTION, SOLUTION INTRAMUSCULAR; INTRAVENOUS EVERY 4 HOURS PRN
Status: DISCONTINUED | OUTPATIENT
Start: 2025-01-01 | End: 2025-01-03 | Stop reason: HOSPADM

## 2025-01-01 RX ORDER — OXYCODONE HYDROCHLORIDE 5 MG/1
5 TABLET ORAL EVERY 6 HOURS PRN
Status: DISCONTINUED | OUTPATIENT
Start: 2025-01-01 | End: 2025-01-03 | Stop reason: HOSPADM

## 2025-01-01 RX ORDER — ONDANSETRON HYDROCHLORIDE 2 MG/ML
4 INJECTION, SOLUTION INTRAVENOUS EVERY 8 HOURS PRN
Status: DISCONTINUED | OUTPATIENT
Start: 2025-01-01 | End: 2025-01-03 | Stop reason: HOSPADM

## 2025-01-01 RX ORDER — KETOROLAC TROMETHAMINE 30 MG/ML
15 INJECTION, SOLUTION INTRAMUSCULAR; INTRAVENOUS ONCE
Status: COMPLETED | OUTPATIENT
Start: 2025-01-01 | End: 2025-01-01

## 2025-01-01 RX ORDER — ACETAMINOPHEN 325 MG/1
650 TABLET ORAL EVERY 4 HOURS PRN
Status: DISCONTINUED | OUTPATIENT
Start: 2025-01-01 | End: 2025-01-02

## 2025-01-01 RX ORDER — NALOXONE HYDROCHLORIDE 0.4 MG/ML
0.2 INJECTION, SOLUTION INTRAMUSCULAR; INTRAVENOUS; SUBCUTANEOUS EVERY 5 MIN PRN
Status: DISCONTINUED | OUTPATIENT
Start: 2025-01-01 | End: 2025-01-03 | Stop reason: HOSPADM

## 2025-01-01 RX ORDER — SODIUM CHLORIDE, SODIUM LACTATE, POTASSIUM CHLORIDE, CALCIUM CHLORIDE 600; 310; 30; 20 MG/100ML; MG/100ML; MG/100ML; MG/100ML
100 INJECTION, SOLUTION INTRAVENOUS CONTINUOUS
Status: ACTIVE | OUTPATIENT
Start: 2025-01-01 | End: 2025-01-02

## 2025-01-01 RX ORDER — ONDANSETRON 4 MG/1
4 TABLET, ORALLY DISINTEGRATING ORAL EVERY 8 HOURS PRN
Status: DISCONTINUED | OUTPATIENT
Start: 2025-01-01 | End: 2025-01-03 | Stop reason: HOSPADM

## 2025-01-01 RX ADMIN — PIPERACILLIN SODIUM AND TAZOBACTAM SODIUM 3.38 G: 3; .375 INJECTION, SOLUTION INTRAVENOUS at 20:13

## 2025-01-01 RX ADMIN — IOHEXOL 75 ML: 350 INJECTION, SOLUTION INTRAVENOUS at 12:01

## 2025-01-01 RX ADMIN — SODIUM CHLORIDE, SODIUM LACTATE, POTASSIUM CHLORIDE, AND CALCIUM CHLORIDE 100 ML/HR: 600; 310; 30; 20 INJECTION, SOLUTION INTRAVENOUS at 20:13

## 2025-01-01 RX ADMIN — PIPERACILLIN SODIUM AND TAZOBACTAM SODIUM 4.5 G: 4; .5 INJECTION, SOLUTION INTRAVENOUS at 13:04

## 2025-01-01 RX ADMIN — ACETAMINOPHEN 650 MG: 325 TABLET ORAL at 23:16

## 2025-01-01 RX ADMIN — SODIUM CHLORIDE 500 ML: 900 INJECTION, SOLUTION INTRAVENOUS at 11:29

## 2025-01-01 RX ADMIN — KETOROLAC TROMETHAMINE 15 MG: 30 INJECTION, SOLUTION INTRAMUSCULAR at 11:06

## 2025-01-01 RX ADMIN — SODIUM CHLORIDE, SODIUM LACTATE, POTASSIUM CHLORIDE, AND CALCIUM CHLORIDE 100 ML/HR: 600; 310; 30; 20 INJECTION, SOLUTION INTRAVENOUS at 17:36

## 2025-01-01 SDOH — SOCIAL STABILITY: SOCIAL INSECURITY
WITHIN THE LAST YEAR, HAVE YOU BEEN KICKED, HIT, SLAPPED, OR OTHERWISE PHYSICALLY HURT BY YOUR PARTNER OR EX-PARTNER?: NO

## 2025-01-01 SDOH — SOCIAL STABILITY: SOCIAL INSECURITY: WITHIN THE LAST YEAR, HAVE YOU BEEN AFRAID OF YOUR PARTNER OR EX-PARTNER?: NO

## 2025-01-01 SDOH — SOCIAL STABILITY: SOCIAL NETWORK: IN A TYPICAL WEEK, HOW MANY TIMES DO YOU TALK ON THE PHONE WITH FAMILY, FRIENDS, OR NEIGHBORS?: NEVER

## 2025-01-01 SDOH — ECONOMIC STABILITY: FOOD INSECURITY: WITHIN THE PAST 12 MONTHS, YOU WORRIED THAT YOUR FOOD WOULD RUN OUT BEFORE YOU GOT THE MONEY TO BUY MORE.: NEVER TRUE

## 2025-01-01 SDOH — SOCIAL STABILITY: SOCIAL INSECURITY: WITHIN THE LAST YEAR, HAVE YOU BEEN HUMILIATED OR EMOTIONALLY ABUSED IN OTHER WAYS BY YOUR PARTNER OR EX-PARTNER?: NO

## 2025-01-01 SDOH — SOCIAL STABILITY: SOCIAL NETWORK
DO YOU BELONG TO ANY CLUBS OR ORGANIZATIONS SUCH AS CHURCH GROUPS, UNIONS, FRATERNAL OR ATHLETIC GROUPS, OR SCHOOL GROUPS?: YES

## 2025-01-01 SDOH — ECONOMIC STABILITY: HOUSING INSECURITY: IN THE LAST 12 MONTHS, WAS THERE A TIME WHEN YOU WERE NOT ABLE TO PAY THE MORTGAGE OR RENT ON TIME?: NO

## 2025-01-01 SDOH — ECONOMIC STABILITY: FOOD INSECURITY: HOW HARD IS IT FOR YOU TO PAY FOR THE VERY BASICS LIKE FOOD, HOUSING, MEDICAL CARE, AND HEATING?: NOT HARD AT ALL

## 2025-01-01 SDOH — SOCIAL STABILITY: SOCIAL INSECURITY: DO YOU FEEL ANYONE HAS EXPLOITED OR TAKEN ADVANTAGE OF YOU FINANCIALLY OR OF YOUR PERSONAL PROPERTY?: NO

## 2025-01-01 SDOH — SOCIAL STABILITY: SOCIAL NETWORK: HOW OFTEN DO YOU ATTEND MEETINGS OF THE CLUBS OR ORGANIZATIONS YOU BELONG TO?: 1 TO 4 TIMES PER YEAR

## 2025-01-01 SDOH — ECONOMIC STABILITY: HOUSING INSECURITY: AT ANY TIME IN THE PAST 12 MONTHS, WERE YOU HOMELESS OR LIVING IN A SHELTER (INCLUDING NOW)?: NO

## 2025-01-01 SDOH — ECONOMIC STABILITY: TRANSPORTATION INSECURITY: IN THE PAST 12 MONTHS, HAS LACK OF TRANSPORTATION KEPT YOU FROM MEDICAL APPOINTMENTS OR FROM GETTING MEDICATIONS?: NO

## 2025-01-01 SDOH — ECONOMIC STABILITY: INCOME INSECURITY: IN THE PAST 12 MONTHS HAS THE ELECTRIC, GAS, OIL, OR WATER COMPANY THREATENED TO SHUT OFF SERVICES IN YOUR HOME?: NO

## 2025-01-01 SDOH — SOCIAL STABILITY: SOCIAL INSECURITY: WERE YOU ABLE TO COMPLETE ALL THE BEHAVIORAL HEALTH SCREENINGS?: YES

## 2025-01-01 SDOH — ECONOMIC STABILITY: FOOD INSECURITY: WITHIN THE PAST 12 MONTHS, THE FOOD YOU BOUGHT JUST DIDN'T LAST AND YOU DIDN'T HAVE MONEY TO GET MORE.: NEVER TRUE

## 2025-01-01 SDOH — SOCIAL STABILITY: SOCIAL INSECURITY: ARE YOU MARRIED, WIDOWED, DIVORCED, SEPARATED, NEVER MARRIED, OR LIVING WITH A PARTNER?: MARRIED

## 2025-01-01 SDOH — SOCIAL STABILITY: SOCIAL INSECURITY: ARE THERE ANY APPARENT SIGNS OF INJURIES/BEHAVIORS THAT COULD BE RELATED TO ABUSE/NEGLECT?: NO

## 2025-01-01 SDOH — SOCIAL STABILITY: SOCIAL INSECURITY: ABUSE: ADULT

## 2025-01-01 SDOH — HEALTH STABILITY: PHYSICAL HEALTH: ON AVERAGE, HOW MANY MINUTES DO YOU ENGAGE IN EXERCISE AT THIS LEVEL?: 30 MIN

## 2025-01-01 SDOH — SOCIAL STABILITY: SOCIAL INSECURITY
WITHIN THE LAST YEAR, HAVE YOU BEEN RAPED OR FORCED TO HAVE ANY KIND OF SEXUAL ACTIVITY BY YOUR PARTNER OR EX-PARTNER?: NO

## 2025-01-01 SDOH — SOCIAL STABILITY: SOCIAL INSECURITY: DO YOU FEEL UNSAFE GOING BACK TO THE PLACE WHERE YOU ARE LIVING?: NO

## 2025-01-01 SDOH — SOCIAL STABILITY: SOCIAL INSECURITY: DOES ANYONE TRY TO KEEP YOU FROM HAVING/CONTACTING OTHER FRIENDS OR DOING THINGS OUTSIDE YOUR HOME?: NO

## 2025-01-01 SDOH — SOCIAL STABILITY: SOCIAL INSECURITY: HAVE YOU HAD THOUGHTS OF HARMING ANYONE ELSE?: NO

## 2025-01-01 SDOH — HEALTH STABILITY: PHYSICAL HEALTH: ON AVERAGE, HOW MANY DAYS PER WEEK DO YOU ENGAGE IN MODERATE TO STRENUOUS EXERCISE (LIKE A BRISK WALK)?: 3 DAYS

## 2025-01-01 SDOH — SOCIAL STABILITY: SOCIAL INSECURITY: ARE YOU OR HAVE YOU BEEN THREATENED OR ABUSED PHYSICALLY, EMOTIONALLY, OR SEXUALLY BY ANYONE?: NO

## 2025-01-01 SDOH — ECONOMIC STABILITY: HOUSING INSECURITY: IN THE PAST 12 MONTHS, HOW MANY TIMES HAVE YOU MOVED WHERE YOU WERE LIVING?: 1

## 2025-01-01 SDOH — SOCIAL STABILITY: SOCIAL NETWORK: HOW OFTEN DO YOU ATTEND CHURCH OR RELIGIOUS SERVICES?: 1 TO 4 TIMES PER YEAR

## 2025-01-01 SDOH — SOCIAL STABILITY: SOCIAL NETWORK: HOW OFTEN DO YOU GET TOGETHER WITH FRIENDS OR RELATIVES?: NEVER

## 2025-01-01 SDOH — SOCIAL STABILITY: SOCIAL INSECURITY: HAS ANYONE EVER THREATENED TO HURT YOUR FAMILY OR YOUR PETS?: NO

## 2025-01-01 SDOH — SOCIAL STABILITY: SOCIAL INSECURITY: HAVE YOU HAD ANY THOUGHTS OF HARMING ANYONE ELSE?: NO

## 2025-01-01 ASSESSMENT — COLUMBIA-SUICIDE SEVERITY RATING SCALE - C-SSRS
6. HAVE YOU EVER DONE ANYTHING, STARTED TO DO ANYTHING, OR PREPARED TO DO ANYTHING TO END YOUR LIFE?: NO
1. IN THE PAST MONTH, HAVE YOU WISHED YOU WERE DEAD OR WISHED YOU COULD GO TO SLEEP AND NOT WAKE UP?: NO
2. HAVE YOU ACTUALLY HAD ANY THOUGHTS OF KILLING YOURSELF?: NO

## 2025-01-01 ASSESSMENT — COGNITIVE AND FUNCTIONAL STATUS - GENERAL
PATIENT BASELINE BEDBOUND: NO
DAILY ACTIVITIY SCORE: 24
MOBILITY SCORE: 24
MOBILITY SCORE: 24
DAILY ACTIVITIY SCORE: 24

## 2025-01-01 ASSESSMENT — ACTIVITIES OF DAILY LIVING (ADL)
FEEDING YOURSELF: INDEPENDENT
BATHING: INDEPENDENT
ADEQUATE_TO_COMPLETE_ADL: YES
PATIENT'S MEMORY ADEQUATE TO SAFELY COMPLETE DAILY ACTIVITIES?: YES
LACK_OF_TRANSPORTATION: NO
HEARING - RIGHT EAR: FUNCTIONAL
DRESSING YOURSELF: INDEPENDENT
HEARING - LEFT EAR: FUNCTIONAL
TOILETING: INDEPENDENT
WALKS IN HOME: INDEPENDENT
GROOMING: INDEPENDENT
JUDGMENT_ADEQUATE_SAFELY_COMPLETE_DAILY_ACTIVITIES: YES

## 2025-01-01 ASSESSMENT — ENCOUNTER SYMPTOMS
CARDIOVASCULAR NEGATIVE: 1
AGITATION: 0
RESPIRATORY NEGATIVE: 1
ABDOMINAL PAIN: 1
ENDOCRINE NEGATIVE: 1
SHORTNESS OF BREATH: 0
CHILLS: 0
APPETITE CHANGE: 0
FEVER: 0
NEUROLOGICAL NEGATIVE: 1
CONFUSION: 0
DIZZINESS: 0
MUSCULOSKELETAL NEGATIVE: 1
EYES NEGATIVE: 1
DIARRHEA: 1
DIAPHORESIS: 0
PSYCHIATRIC NEGATIVE: 1
ACTIVITY CHANGE: 0
NAUSEA: 1

## 2025-01-01 ASSESSMENT — LIFESTYLE VARIABLES
SUBSTANCE_ABUSE_PAST_12_MONTHS: NO
SKIP TO QUESTIONS 9-10: 0
HOW MANY STANDARD DRINKS CONTAINING ALCOHOL DO YOU HAVE ON A TYPICAL DAY: 1 OR 2
HOW OFTEN DO YOU HAVE A DRINK CONTAINING ALCOHOL: MONTHLY OR LESS
AUDIT-C TOTAL SCORE: 2
AUDIT-C TOTAL SCORE: 2
PRESCIPTION_ABUSE_PAST_12_MONTHS: NO
HOW OFTEN DO YOU HAVE 6 OR MORE DRINKS ON ONE OCCASION: LESS THAN MONTHLY

## 2025-01-01 ASSESSMENT — PATIENT HEALTH QUESTIONNAIRE - PHQ9
1. LITTLE INTEREST OR PLEASURE IN DOING THINGS: NOT AT ALL
2. FEELING DOWN, DEPRESSED OR HOPELESS: NOT AT ALL
SUM OF ALL RESPONSES TO PHQ9 QUESTIONS 1 & 2: 0

## 2025-01-01 ASSESSMENT — PAIN SCALES - GENERAL
PAINLEVEL_OUTOF10: 4
PAINLEVEL_OUTOF10: 5 - MODERATE PAIN
PAINLEVEL_OUTOF10: 0 - NO PAIN

## 2025-01-01 ASSESSMENT — PAIN DESCRIPTION - ORIENTATION: ORIENTATION: MID

## 2025-01-01 ASSESSMENT — PAIN DESCRIPTION - PROGRESSION: CLINICAL_PROGRESSION: NOT CHANGED

## 2025-01-01 ASSESSMENT — PAIN DESCRIPTION - LOCATION
LOCATION: ABDOMEN
LOCATION: ABDOMEN

## 2025-01-01 ASSESSMENT — PAIN - FUNCTIONAL ASSESSMENT: PAIN_FUNCTIONAL_ASSESSMENT: 0-10

## 2025-01-01 NOTE — LETTER
To whom it may concern,     Please excuse Ivana Funes from work as she has been under our care as of 1/1/25 and is being discharged 1/3/25 and will remain under our care and follow-up with us in the office 1/10/25. She may return to work 1/11/25.     Thank you and have a blessed day,  Zaria Lamar CNP  Phone [phone] [address] [place] [address] [place] [id number]

## 2025-01-01 NOTE — ED PROVIDER NOTES
HPI   Chief Complaint   Patient presents with    Abdominal Pain     Abd pain that started Friday, states she has not been eating or drinking, states she has hx of diverticulitis.        Patient is a 64-year-old female presenting with abdominal pain started on Friday.  She states that she has a history of diverticulitis and has had several flares in the past.  She endorses that she was seen by surgeon who did recommend elective surgery.  She states that she is not very much interested in the surgery.  She has required antibiotics to help with her symptoms.  Endorses that she recently came back from a cruise and has been having abdominal pain.  States that she has not been eating or drinking because of the pain and it makes it worse.  She has tried Tylenol earlier today with minimal relief.  Patient denies fevers, chills, cough, sore throat, runny nose, chest pain, shortness of breath, vomiting, diarrhea or urinary complaints.              Patient History   Past Medical History:   Diagnosis Date    Colon polyp     Diverticulitis of intestine with abscess     Flushing     Hot flashes     Past Surgical History:   Procedure Laterality Date    COLONOSCOPY  08/22/2022    OOPHORECTOMY  04/10/2014    Oophorectomy    OOPHORECTOMY Bilateral      Family History   Problem Relation Name Age of Onset    Diabetes Mother Sabiha     Hypertension Mother Sabiha     Hypertension Father      Hypertension Sister      Diabetes Brother Zach      Social History     Tobacco Use    Smoking status: Former     Current packs/day: 0.25     Average packs/day: 0.3 packs/day for 5.0 years (1.3 ttl pk-yrs)     Types: Cigarettes    Smokeless tobacco: Never   Vaping Use    Vaping status: Never Used   Substance Use Topics    Alcohol use: Yes     Comment: socially    Drug use: Never       Physical Exam   ED Triage Vitals [01/01/25 1043]   Temperature Heart Rate Respirations BP   36 °C (96.8 °F) 75 16 130/77      Pulse Ox Temp Source Heart Rate Source  Patient Position   98 % Temporal Monitor Sitting      BP Location FiO2 (%)     Right arm --       Physical Exam  Vitals and nursing note reviewed.   Constitutional:       Appearance: She is well-developed.      Comments: Awake, sitting on the edge of examination bed   HENT:      Head: Normocephalic and atraumatic.      Nose: Nose normal.      Mouth/Throat:      Mouth: Mucous membranes are moist.      Pharynx: Oropharynx is clear.   Eyes:      Extraocular Movements: Extraocular movements intact.      Conjunctiva/sclera: Conjunctivae normal.      Pupils: Pupils are equal, round, and reactive to light.   Cardiovascular:      Rate and Rhythm: Normal rate and regular rhythm.      Pulses: Normal pulses.      Heart sounds: Normal heart sounds. No murmur heard.  Pulmonary:      Effort: Pulmonary effort is normal. No respiratory distress.      Breath sounds: Normal breath sounds.   Abdominal:      General: Abdomen is flat.      Palpations: Abdomen is soft.      Tenderness: There is abdominal tenderness in the left lower quadrant.   Musculoskeletal:         General: No swelling. Normal range of motion.      Cervical back: Normal range of motion and neck supple.   Skin:     General: Skin is warm and dry.      Capillary Refill: Capillary refill takes less than 2 seconds.   Neurological:      General: No focal deficit present.      Mental Status: She is alert and oriented to person, place, and time.   Psychiatric:         Mood and Affect: Mood normal.         Behavior: Behavior normal.           ED Course & MDM   ED Course as of 01/01/25 1534   Wed Jan 01, 2025   1429 I spoke with Dr. Rapp, general surgery.  He believes that the patient needs interventional radiology.  He states that ultimately does not need gynecologic intervention at this time. [AR]   1430 We have gone through the transfer center and radiology operations to see where the patient can be transferred that has been available to and interventional radiology.   [place] [facility]  [facility] does have bed availability but cannot guarantee IR.  [facility] does have IR tomorrow. [AR]   1431 I spoke with Dr. Franklin, general medicine, who has declined to accept this patient due to having no IR coverage tonight.  He states that if the patient does decompensate, there is nothing that he can do to help this patient.  Attending physician Dr. Li has reached out to general surgeon who will accept this patient to his services with medicine on consult. [AR]      ED Course User Index  [AR] Brandon Taylor PA-C         Diagnoses as of 01/01/25 1534   Pelvic abscess in female   Diverticulitis   Left lower quadrant abdominal pain                 No data recorded     Mau Coma Scale Score: 15 (01/01/25 1044 : Jayne Motta RN)                           Medical Decision Making  Patient is a 64-year-old female with past medical history of diverticulitis presenting with abdominal pain that started on Friday.  Lab work, urine, imaging ordered.  Toradol ordered.  IV fluids ordered.  Conditions considered include but are not limited to: Diverticulitis, other intra-abdominal pathology, viral illness.    I saw this patient in conjunction with Dr. Li.  CBC is without leukocytosis or anemia.  CMP without significant electrolyte abnormality or renal impairment.  UA with micro is without infection.  Toradol given with minimal assistance to the patient's pain.  Lipase within normal limit.  CAT scan does show concern for diverticulitis and pelvic abscess.  Patient was given IV Zosyn.  As described above, I did speak with general surgery.  Attending physician and I have tried to get this patient transferred to decrease capacity at [facility] but due to lack of bed availability, patient will be excepted here.  Patient was initially declined admission by hospitalist team due to potential complications overnight and no interventional radiology coverage  this evening.  General surgery is agreeable to accept with general medicine on consult.      Portions of this note made with Dragon software, please be mindful of potential grammatical errors.        Medications   ketorolac (Toradol) injection 15 mg (15 mg intravenous Given 1/1/25 1106)   sodium chloride 0.9 % bolus 500 mL (0 mL intravenous Stopped 1/1/25 1237)   iohexol (OMNIPaque) 350 mg iodine/mL solution 75 mL (75 mL intravenous Given 1/1/25 1201)   piperacillin-tazobactam (Zosyn) 4.5 g in dextrose (iso)  mL (0 g intravenous Stopped 1/1/25 1437)         Labs Reviewed   COMPREHENSIVE METABOLIC PANEL - Abnormal       Result Value    Glucose 102 (*)     Sodium 135 (*)     Potassium 3.9      Chloride 105      Bicarbonate 21      Anion Gap 13      Urea Nitrogen 15      Creatinine 0.86      eGFR 75      Calcium 8.7      Albumin 3.5      Alkaline Phosphatase 65      Total Protein 7.3      AST 56 (*)     Bilirubin, Total 0.3      ALT 30     CBC WITH AUTO DIFFERENTIAL - Abnormal    WBC 8.6      nRBC 0.0      RBC 4.25      Hemoglobin 12.3      Hematocrit 36.2      MCV 85      MCH 28.9      MCHC 34.0      RDW 12.5      Platelets 504 (*)     Neutrophils % 73.4      Immature Granulocytes %, Automated 1.3 (*)     Lymphocytes % 14.9      Monocytes % 7.4      Eosinophils % 2.4      Basophils % 0.6      Neutrophils Absolute 6.33      Immature Granulocytes Absolute, Automated 0.11      Lymphocytes Absolute 1.29      Monocytes Absolute 0.64      Eosinophils Absolute 0.21      Basophils Absolute 0.05     URINALYSIS WITH REFLEX CULTURE AND MICROSCOPIC - Abnormal    Color, Urine Yellow      Appearance, Urine Clear      Specific Gravity, Urine >1.050 (*)     pH, Urine 6.5      Protein, Urine 70 (1+) (*)     Glucose, Urine Normal      Blood, Urine NEGATIVE      Ketones, Urine 10 (1+) (*)     Bilirubin, Urine NEGATIVE      Urobilinogen, Urine Normal      Nitrite, Urine NEGATIVE      Leukocyte Esterase, Urine NEGATIVE     LIPASE -  Normal    Lipase 39      Narrative:     Venipuncture immediately after or during the administration of Metamizole may lead to falsely low results. Testing should be performed immediately prior to Metamizole dosing.   URINALYSIS WITH REFLEX CULTURE AND MICROSCOPIC    Narrative:     The following orders were created for panel order Urinalysis with Reflex Culture and Microscopic.  Procedure                               Abnormality         Status                     ---------                               -----------         ------                     Urinalysis with Reflex C...[[id number]]  Abnormal            Final result               Extra Urine Gray Tube[[id number]]                            In process                   Please view results for these tests on the individual orders.   EXTRA URINE GRAY TUBE   URINALYSIS MICROSCOPIC WITH REFLEX CULTURE    WBC, Urine NONE      RBC, Urine 3-5      Squamous Epithelial Cells, Urine 1-9 (SPARSE)      Mucus, Urine 3+           CT abdomen pelvis w IV contrast   Final Result   Rim enhancing fluid collection in the pelvis concerning for abscess.   This is inseparable from the uterus, portions of the bladder, and   sigmoid colon. There is also loss of fat plane with the adjacent   small bowel loops. Stranding/inflammatory changes throughout the   pelvis. Findings are suggestive of sequelae of diverticulitis with   areas of fistula formation not excluded.        Bowel wall thickening involving the sigmoid colon presumably related   to chronic changes of colitis however given the stranding in this   region superimposed acute colitis not excluded.        Colonic diverticulosis.        Signed by: Sandip Reynolds 1/1/2025 12:30 PM   Dictation workstation:   [id number]            Procedure  Procedures     Brandon Taylor PA-C  01/01/25 1536

## 2025-01-01 NOTE — CONSULTS
Inpatient consult to Medicine  Consult performed by: Nory Cox, APRN-CNP  Consult ordered by: Brandon Taylor PA-C        Reason For Consult  Blood pressure, symptoms management in the setting of abscess.     History Of Present Illness  Ivana Funes is a 64 y.o. female presenting with abdominal pain. States she and her  were in [place]. On Thursday, she developed abdominal pain, nausea and diarrhea. States stool was dark, but no bright red blood. Denies fevers, chills. She went to see a physician on Saturday and was prescribed Tylenol and Augmentin. She has had some improvement in pain since starting antibiotics. Feels some improvement in pain with Tylenol.      Past Medical History  She has a past medical history of Colon polyp, Diverticulitis of intestine with abscess, and Flushing.    Surgical History  She has a past surgical history that includes Oophorectomy (04/10/2014); Oophorectomy (Bilateral); and Colonoscopy (08/22/2022).     Social History  She reports that she has quit smoking. Her smoking use included cigarettes. She has a 1.3 pack-year smoking history. She has never used smokeless tobacco. She reports current alcohol use. She reports that she does not use drugs.    Family History  Family History   Problem Relation Name Age of Onset    Diabetes Mother Sabiha     Hypertension Mother Sabiha     Hypertension Father      Hypertension Sister      Diabetes Brother Zach         Allergies  Patient has no known allergies.    Review of Systems   Constitutional:  Negative for chills and fever.   HENT: Negative.     Eyes: Negative.    Respiratory: Negative.     Cardiovascular: Negative.    Gastrointestinal:  Positive for abdominal pain, diarrhea and nausea.   Endocrine: Negative.    Genitourinary: Negative.    Musculoskeletal: Negative.    Skin: Negative.    Neurological: Negative.    Psychiatric/Behavioral: Negative.          Physical Exam  Constitutional:       Appearance: Normal appearance.    HENT:      Head: Normocephalic and atraumatic.      Mouth/Throat:      Mouth: Mucous membranes are moist.      Pharynx: Oropharynx is clear.   Eyes:      Extraocular Movements: Extraocular movements intact.      Conjunctiva/sclera: Conjunctivae normal.      Pupils: Pupils are equal, round, and reactive to light.   Cardiovascular:      Rate and Rhythm: Normal rate and regular rhythm.      Pulses: Normal pulses.      Heart sounds: Normal heart sounds.   Pulmonary:      Effort: Pulmonary effort is normal.      Breath sounds: Normal breath sounds.   Abdominal:      General: Bowel sounds are normal.      Palpations: Abdomen is soft.      Tenderness: There is abdominal tenderness (mild with palpation).   Musculoskeletal:         General: Normal range of motion.      Cervical back: Normal range of motion and neck supple.   Skin:     General: Skin is warm and dry.      Capillary Refill: Capillary refill takes less than 2 seconds.   Neurological:      General: No focal deficit present.      Mental Status: She is alert and oriented to person, place, and time.   Psychiatric:         Mood and Affect: Mood normal.         Behavior: Behavior normal.          Last Recorded Vitals  /70 (BP Location: Right arm, Patient Position: Sitting)   Pulse 61   Temp 36 °C (96.8 °F) (Temporal)   Resp (!) 22   Wt 70.3 kg (155 lb)   SpO2 100%     Relevant Results  Results for orders placed or performed during the hospital encounter of 01/01/25 (from the past 24 hours)   Lipase   Result Value Ref Range    Lipase 39 9 - 82 U/L   Comprehensive Metabolic Panel   Result Value Ref Range    Glucose 102 (H) 74 - 99 mg/dL    Sodium 135 (L) 136 - 145 mmol/L    Potassium 3.9 3.5 - 5.3 mmol/L    Chloride 105 98 - 107 mmol/L    Bicarbonate 21 21 - 32 mmol/L    Anion Gap 13 10 - 20 mmol/L    Urea Nitrogen 15 6 - 23 mg/dL    Creatinine 0.86 0.50 - 1.05 mg/dL    eGFR 75 >60 mL/min/1.73m*2    Calcium 8.7 8.6 - 10.3 mg/dL    Albumin 3.5 3.4 - 5.0 g/dL     Alkaline Phosphatase 65 33 - 136 U/L    Total Protein 7.3 6.4 - 8.2 g/dL    AST 56 (H) 9 - 39 U/L    Bilirubin, Total 0.3 0.0 - 1.2 mg/dL    ALT 30 7 - 45 U/L   CBC and Auto Differential   Result Value Ref Range    WBC 8.6 4.4 - 11.3 x10*3/uL    nRBC 0.0 0.0 - 0.0 /100 WBCs    RBC 4.25 4.00 - 5.20 x10*6/uL    Hemoglobin 12.3 12.0 - 16.0 g/dL    Hematocrit 36.2 36.0 - 46.0 %    MCV 85 80 - 100 fL    MCH 28.9 26.0 - 34.0 pg    MCHC 34.0 32.0 - 36.0 g/dL    RDW 12.5 11.5 - 14.5 %    Platelets 504 (H) 150 - 450 x10*3/uL    Neutrophils % 73.4 40.0 - 80.0 %    Immature Granulocytes %, Automated 1.3 (H) 0.0 - 0.9 %    Lymphocytes % 14.9 13.0 - 44.0 %    Monocytes % 7.4 2.0 - 10.0 %    Eosinophils % 2.4 0.0 - 6.0 %    Basophils % 0.6 0.0 - 2.0 %    Neutrophils Absolute 6.33 1.20 - 7.70 x10*3/uL    Immature Granulocytes Absolute, Automated 0.11 0.00 - 0.70 x10*3/uL    Lymphocytes Absolute 1.29 1.20 - 4.80 x10*3/uL    Monocytes Absolute 0.64 0.10 - 1.00 x10*3/uL    Eosinophils Absolute 0.21 0.00 - 0.70 x10*3/uL    Basophils Absolute 0.05 0.00 - 0.10 x10*3/uL   Urinalysis with Reflex Culture and Microscopic   Result Value Ref Range    Color, Urine Yellow Light-Yellow, Yellow, Dark-Yellow    Appearance, Urine Clear Clear    Specific Gravity, Urine >1.050 (N) 1.005 - 1.035    pH, Urine 6.5 5.0, 5.5, 6.0, 6.5, 7.0, 7.5, 8.0    Protein, Urine 70 (1+) (A) NEGATIVE, 10 (TRACE), 20 (TRACE) mg/dL    Glucose, Urine Normal Normal mg/dL    Blood, Urine NEGATIVE NEGATIVE    Ketones, Urine 10 (1+) (A) NEGATIVE mg/dL    Bilirubin, Urine NEGATIVE NEGATIVE    Urobilinogen, Urine Normal Normal mg/dL    Nitrite, Urine NEGATIVE NEGATIVE    Leukocyte Esterase, Urine NEGATIVE NEGATIVE   Urinalysis Microscopic   Result Value Ref Range    WBC, Urine NONE 1-5, NONE /HPF    RBC, Urine 3-5 NONE, 1-2, 3-5 /HPF    Squamous Epithelial Cells, Urine 1-9 (SPARSE) Reference range not established. /HPF    Mucus, Urine 3+ Reference range not established.  /LPF     CT abdomen pelvis w IV contrast    Result Date: 1/1/2025  Interpreted By:  Sandip Reynolds, STUDY: CT ABDOMEN PELVIS W IV CONTRAST;  1/1/2025 12:07 pm   INDICATION: 63 y/o   F with  Signs/Symptoms:abdominal pain, hx of diverticulitis. concern for diverticulitis.   LIMITATIONS: None.   ACCESSION NUMBER(S): [id number]   ORDERING CLINICIAN: LUIS HSU   TECHNIQUE: After the administration of IV iodonated contrast, spiral axial images were obtained from the xiphoid down through the symphysis pubis. Sagittal and coronal reconstruction images were generated. 75 mL of Omnipaque 350.   COMPARISON: 11/07/2023   FINDINGS: Lower Chest: Scattered streaky atelectasis in the visualized lower lungs.   Liver: The liver is unremarkable without focal lesion.   Gallbladder and Biliary: Unremarkable.   Pancreas: No abnormality identified in the pancreas.   Spleen: No abnormality identified in the spleen.   Adrenals: No abnormality identified in either adrenal gland.   Urinary: Subcentimeter right renal hypodensity, too small to characterize. No hydronephrosis. Bladder wall thickening and adjacent stranding. The bladder is inseparable from the fluid collection along its superior aspect.   Gastrointestinal/Peritoneum: No small or large bowel obstruction in the visualized abdomen. In the abdomen, there is no extraluminal air. There is a 6 x 4.3 cm rim enhancing collection inseparable from the sigmoid colon and uterus concerning for abscess.. There are spiculations extending to other portions of sigmoid colon. There are also small bowel loops that run adjacent to this area of inflammation. Stranding is seen in the surrounding pelvic soft tissues. There is bowel wall thickening involving the sigmoid colon with surrounding fat stranding. Sigmoid colonic diverticulosis. Diverticula are seen scattered diffusely throughout the colon. No evidence of acute appendicitis.   Vascular: Abdominal aorta is normal in caliber.    Lymphatics: No enlarged lymph nodes by size criteria.   MSK/Body Wall: No aggressive bony lesion identified. Degenerative changes at L5-S1.       Rim enhancing fluid collection in the pelvis concerning for abscess. This is inseparable from the uterus, portions of the bladder, and sigmoid colon. There is also loss of fat plane with the adjacent small bowel loops. Stranding/inflammatory changes throughout the pelvis. Findings are suggestive of sequelae of diverticulitis with areas of fistula formation not excluded.   Bowel wall thickening involving the sigmoid colon presumably related to chronic changes of colitis however given the stranding in this region superimposed acute colitis not excluded.   Colonic diverticulosis.   Signed by: Sandip Reynolds 1/1/2025 12:30 PM Dictation workstation:   [id number]        Assessment/Plan     Pelvic abscess in female  CT scan reveals rim enhancing fluid collection. Suggestive of diverticulitis with areas of fistula formation   Consult to IR for drainage   - NPO after midnight   IV antibiotics   Pain management per primary service   IV fluids     Diverticulitis of large intestine without bleeding  Pain management per primary service   IV antibiotics per primary service   Plan for abscess drainage with IR 1/2/25    DVT prophylaxis   SCDs - low risk     Nory Cox, APRN-CNP         pt presents to ed ambulatory for evaluation of vaginal pain and "rawness" , dysuria, and pressure when urinating. in addition, pt reports feeling fatigue and chills with syncopal episode this morning at work. pt has no other complaints

## 2025-01-01 NOTE — Clinical Note
Pt with mild abdominal discomfort with 10fr drain placement into mid abdomen. 60 MLS's of pus obtained from mid abdominal drain, 10 mls collected for lab analysis

## 2025-01-01 NOTE — ASSESSMENT & PLAN NOTE
Pain management per primary service   IV antibiotics per primary service   Plan for abscess drainage with IR today

## 2025-01-01 NOTE — ASSESSMENT & PLAN NOTE
CT scan reveals rim enhancing fluid collection. Suggestive of diverticulitis with areas of fistula formation   Consult to IR for drainage   - NPO after midnight   - Plan for today   IV antibiotics   Pain management per primary service   IV fluids

## 2025-01-01 NOTE — H&P
History Of Present Illness  Ivana Funes is a 64 y.o. female with a history of recurrent diverticulitis.  She just got back from a vacation in [place] yesterday evening.  Since Saturday she had been feeling ill, and was given oral antibiotics at the clinic [place] for presumed diverticulitis.  She presented to the emergency department this morning for further evaluation.  This episode feels similar to her prior episodes of diverticulitis.  CT scan shows a larger abscess in the left lower quadrant.  Colon itself is not overly inflamed.  No pneumoperitoneum or free fluid.  She is afebrile, and normal white blood cell count.  The patient is upset about no beds being available in the hospital, and is unsure whether she wants to stay and wait in the emergency department.     Past Medical History  Past Medical History:   Diagnosis Date    Colon polyp     Diverticulitis of intestine with abscess     Flushing     Hot flashes       Surgical History  Past Surgical History:   Procedure Laterality Date    COLONOSCOPY  08/22/2022    OOPHORECTOMY  04/10/2014    Oophorectomy    OOPHORECTOMY Bilateral         Social History  She reports that she has quit smoking. Her smoking use included cigarettes. She has a 1.3 pack-year smoking history. She has never used smokeless tobacco. She reports current alcohol use. She reports that she does not use drugs.    Family History  Family History   Problem Relation Name Age of Onset    Diabetes Mother Sabiha     Hypertension Mother Sabiha     Hypertension Father      Hypertension Sister      Diabetes Brother Zach         Allergies  Patient has no known allergies.    Review of Systems   Constitutional:  Negative for activity change, appetite change, chills, diaphoresis and fever.   Respiratory:  Negative for shortness of breath.    Cardiovascular:  Negative for chest pain.   Gastrointestinal:  Positive for abdominal pain and diarrhea.   Neurological:  Negative for  "dizziness.   Psychiatric/Behavioral:  Negative for agitation and confusion.    All other systems reviewed and are negative.       Physical Exam  Constitutional:       Appearance: Normal appearance.   Cardiovascular:      Rate and Rhythm: Normal rate.   Pulmonary:      Effort: Pulmonary effort is normal.   Abdominal:      General: Abdomen is flat.      Palpations: Abdomen is soft.      Tenderness: There is abdominal tenderness. There is no guarding or rebound.   Neurological:      General: No focal deficit present.      Mental Status: She is alert.   Psychiatric:         Mood and Affect: Mood normal.          Last Recorded Vitals  Blood pressure 151/70, pulse 61, temperature 36 °C (96.8 °F), temperature source Temporal, resp. rate (!) 22, height 1.651 m (5' 5\"), weight 70.3 kg (155 lb), SpO2 100%.    Relevant Results        Lab Results   Component Value Date    WBC 8.6 01/01/2025    HGB 12.3 01/01/2025    HCT 36.2 01/01/2025    MCV 85 01/01/2025     (H) 01/01/2025     Lab Results   Component Value Date    GLUCOSE 102 (H) 01/01/2025    CALCIUM 8.7 01/01/2025     (L) 01/01/2025    K 3.9 01/01/2025    CO2 21 01/01/2025     01/01/2025    BUN 15 01/01/2025    CREATININE 0.86 01/01/2025     === 01/01/25 ===    CT ABDOMEN PELVIS W IV CONTRAST    - Impression -  Rim enhancing fluid collection in the pelvis concerning for abscess.  This is inseparable from the uterus, portions of the bladder, and  sigmoid colon. There is also loss of fat plane with the adjacent  small bowel loops. Stranding/inflammatory changes throughout the  pelvis. Findings are suggestive of sequelae of diverticulitis with  areas of fistula formation not excluded.    Bowel wall thickening involving the sigmoid colon presumably related  to chronic changes of colitis however given the stranding in this  region superimposed acute colitis not excluded.    Colonic diverticulosis.    Signed by: Sandip Reynolds 1/1/2025 12:30 PM  Dictation " workstation:   [id number]       Assessment/Plan   Assessment & Plan  Pelvic abscess in female      The patient is a 64-year-old woman with a history of recurrent diverticulitis who presents with pelvic abscess.  We will plan to admit her, consult interventional radiology for drain placement.  The patient is unsure whether or not she wants to wait in the emergency department without having a bed.  Will continue IV antibiotics while she is awaiting drain placement.  Ok for diet as tolerated this evening.  NPO after midnight.         I spent 60 minutes in the professional and overall care of this patient.      Zach Rapp MD

## 2025-01-02 ENCOUNTER — APPOINTMENT (OUTPATIENT)
Dept: RADIOLOGY | Facility: HOSPITAL | Age: 65
DRG: 392 | End: 2025-01-02
Payer: COMMERCIAL

## 2025-01-02 LAB
ALBUMIN SERPL BCP-MCNC: 2.9 G/DL (ref 3.4–5)
ALP SERPL-CCNC: 54 U/L (ref 33–136)
ALT SERPL W P-5'-P-CCNC: 21 U/L (ref 7–45)
ANION GAP SERPL CALCULATED.3IONS-SCNC: 11 MMOL/L (ref 10–20)
AST SERPL W P-5'-P-CCNC: 31 U/L (ref 9–39)
BILIRUB SERPL-MCNC: 0.3 MG/DL (ref 0–1.2)
BUN SERPL-MCNC: 13 MG/DL (ref 6–23)
CALCIUM SERPL-MCNC: 7.9 MG/DL (ref 8.6–10.3)
CHLORIDE SERPL-SCNC: 108 MMOL/L (ref 98–107)
CO2 SERPL-SCNC: 23 MMOL/L (ref 21–32)
CREAT SERPL-MCNC: 0.87 MG/DL (ref 0.5–1.05)
EGFRCR SERPLBLD CKD-EPI 2021: 74 ML/MIN/1.73M*2
ERYTHROCYTE [DISTWIDTH] IN BLOOD BY AUTOMATED COUNT: 12.4 % (ref 11.5–14.5)
GLUCOSE SERPL-MCNC: 92 MG/DL (ref 74–99)
HCT VFR BLD AUTO: 32.3 % (ref 36–46)
HGB BLD-MCNC: 11 G/DL (ref 12–16)
HOLD SPECIMEN: NORMAL
INR PPP: 1.1 (ref 0.9–1.2)
MCH RBC QN AUTO: 28.8 PG (ref 26–34)
MCHC RBC AUTO-ENTMCNC: 34.1 G/DL (ref 32–36)
MCV RBC AUTO: 85 FL (ref 80–100)
NRBC BLD-RTO: 0 /100 WBCS (ref 0–0)
PLATELET # BLD AUTO: 437 X10*3/UL (ref 150–450)
POTASSIUM SERPL-SCNC: 3.6 MMOL/L (ref 3.5–5.3)
PROT SERPL-MCNC: 6.2 G/DL (ref 6.4–8.2)
PROTHROMBIN TIME: 11.2 SECONDS (ref 9.3–12.7)
RBC # BLD AUTO: 3.82 X10*6/UL (ref 4–5.2)
SODIUM SERPL-SCNC: 138 MMOL/L (ref 136–145)
WBC # BLD AUTO: 7.8 X10*3/UL (ref 4.4–11.3)

## 2025-01-02 PROCEDURE — 2500000001 HC RX 250 WO HCPCS SELF ADMINISTERED DRUGS (ALT 637 FOR MEDICARE OP): Performed by: SURGERY

## 2025-01-02 PROCEDURE — 2500000004 HC RX 250 GENERAL PHARMACY W/ HCPCS (ALT 636 FOR OP/ED): Performed by: STUDENT IN AN ORGANIZED HEALTH CARE EDUCATION/TRAINING PROGRAM

## 2025-01-02 PROCEDURE — RXMED WILLOW AMBULATORY MEDICATION CHARGE

## 2025-01-02 PROCEDURE — 1100000001 HC PRIVATE ROOM DAILY

## 2025-01-02 PROCEDURE — 99152 MOD SED SAME PHYS/QHP 5/>YRS: CPT

## 2025-01-02 PROCEDURE — 2500000004 HC RX 250 GENERAL PHARMACY W/ HCPCS (ALT 636 FOR OP/ED): Performed by: SURGERY

## 2025-01-02 PROCEDURE — 0W9J30Z DRAINAGE OF PELVIC CAVITY WITH DRAINAGE DEVICE, PERCUTANEOUS APPROACH: ICD-10-PCS | Performed by: STUDENT IN AN ORGANIZED HEALTH CARE EDUCATION/TRAINING PROGRAM

## 2025-01-02 PROCEDURE — 36415 COLL VENOUS BLD VENIPUNCTURE: CPT | Performed by: SURGERY

## 2025-01-02 PROCEDURE — 99232 SBSQ HOSP IP/OBS MODERATE 35: CPT | Performed by: NURSE PRACTITIONER

## 2025-01-02 PROCEDURE — 2500000001 HC RX 250 WO HCPCS SELF ADMINISTERED DRUGS (ALT 637 FOR MEDICARE OP)

## 2025-01-02 PROCEDURE — 84075 ASSAY ALKALINE PHOSPHATASE: CPT | Performed by: SURGERY

## 2025-01-02 PROCEDURE — 49405 IMAGE CATH FLUID COLXN VISC: CPT

## 2025-01-02 PROCEDURE — 2500000004 HC RX 250 GENERAL PHARMACY W/ HCPCS (ALT 636 FOR OP/ED)

## 2025-01-02 PROCEDURE — 2720000007 HC OR 272 NO HCPCS

## 2025-01-02 PROCEDURE — 85027 COMPLETE CBC AUTOMATED: CPT | Performed by: SURGERY

## 2025-01-02 PROCEDURE — 99153 MOD SED SAME PHYS/QHP EA: CPT

## 2025-01-02 PROCEDURE — C1769 GUIDE WIRE: HCPCS

## 2025-01-02 PROCEDURE — C1729 CATH, DRAINAGE: HCPCS

## 2025-01-02 PROCEDURE — 85610 PROTHROMBIN TIME: CPT | Performed by: SURGERY

## 2025-01-02 PROCEDURE — 87077 CULTURE AEROBIC IDENTIFY: CPT | Mod: WESLAB | Performed by: SURGERY

## 2025-01-02 RX ORDER — AMOXICILLIN AND CLAVULANATE POTASSIUM 875; 125 MG/1; MG/1
875 TABLET, FILM COATED ORAL 2 TIMES DAILY
Qty: 20 TABLET | Refills: 0 | Status: SHIPPED | OUTPATIENT
Start: 2025-01-02 | End: 2025-01-13

## 2025-01-02 RX ORDER — MIDAZOLAM HYDROCHLORIDE 1 MG/ML
INJECTION, SOLUTION INTRAMUSCULAR; INTRAVENOUS
Status: COMPLETED | OUTPATIENT
Start: 2025-01-02 | End: 2025-01-02

## 2025-01-02 RX ORDER — FENTANYL CITRATE 50 UG/ML
INJECTION, SOLUTION INTRAMUSCULAR; INTRAVENOUS
Status: COMPLETED | OUTPATIENT
Start: 2025-01-02 | End: 2025-01-02

## 2025-01-02 RX ORDER — KETOROLAC TROMETHAMINE 30 MG/ML
15 INJECTION, SOLUTION INTRAMUSCULAR; INTRAVENOUS EVERY 6 HOURS
Status: DISCONTINUED | OUTPATIENT
Start: 2025-01-02 | End: 2025-01-03 | Stop reason: HOSPADM

## 2025-01-02 RX ORDER — LIDOCAINE HYDROCHLORIDE 10 MG/ML
INJECTION, SOLUTION EPIDURAL; INFILTRATION; INTRACAUDAL; PERINEURAL
Status: COMPLETED | OUTPATIENT
Start: 2025-01-02 | End: 2025-01-02

## 2025-01-02 RX ORDER — ACETAMINOPHEN 325 MG/1
650 TABLET ORAL EVERY 4 HOURS
Status: DISCONTINUED | OUTPATIENT
Start: 2025-01-02 | End: 2025-01-03 | Stop reason: HOSPADM

## 2025-01-02 RX ADMIN — FENTANYL CITRATE 50 MCG: 0.05 INJECTION, SOLUTION INTRAMUSCULAR; INTRAVENOUS at 14:25

## 2025-01-02 RX ADMIN — MIDAZOLAM 0.5 MG: 1 INJECTION INTRAMUSCULAR; INTRAVENOUS at 14:46

## 2025-01-02 RX ADMIN — OXYCODONE 5 MG: 5 TABLET ORAL at 17:52

## 2025-01-02 RX ADMIN — KETOROLAC TROMETHAMINE 15 MG: 30 INJECTION, SOLUTION INTRAMUSCULAR at 22:10

## 2025-01-02 RX ADMIN — MORPHINE SULFATE 4 MG: 4 INJECTION, SOLUTION INTRAMUSCULAR; INTRAVENOUS at 10:51

## 2025-01-02 RX ADMIN — SODIUM CHLORIDE, SODIUM LACTATE, POTASSIUM CHLORIDE, AND CALCIUM CHLORIDE 100 ML/HR: 600; 310; 30; 20 INJECTION, SOLUTION INTRAVENOUS at 09:19

## 2025-01-02 RX ADMIN — ACETAMINOPHEN 650 MG: 325 TABLET ORAL at 20:08

## 2025-01-02 RX ADMIN — PIPERACILLIN SODIUM AND TAZOBACTAM SODIUM 3.38 G: 3; .375 INJECTION, SOLUTION INTRAVENOUS at 02:26

## 2025-01-02 RX ADMIN — PIPERACILLIN SODIUM AND TAZOBACTAM SODIUM 3.38 G: 3; .375 INJECTION, SOLUTION INTRAVENOUS at 20:08

## 2025-01-02 RX ADMIN — KETOROLAC TROMETHAMINE 15 MG: 30 INJECTION, SOLUTION INTRAMUSCULAR at 16:17

## 2025-01-02 RX ADMIN — ACETAMINOPHEN 650 MG: 325 TABLET ORAL at 16:17

## 2025-01-02 RX ADMIN — FENTANYL CITRATE 25 MCG: 0.05 INJECTION, SOLUTION INTRAMUSCULAR; INTRAVENOUS at 14:46

## 2025-01-02 RX ADMIN — PIPERACILLIN SODIUM AND TAZOBACTAM SODIUM 3.38 G: 3; .375 INJECTION, SOLUTION INTRAVENOUS at 09:19

## 2025-01-02 RX ADMIN — MIDAZOLAM 1 MG: 1 INJECTION INTRAMUSCULAR; INTRAVENOUS at 14:26

## 2025-01-02 RX ADMIN — LIDOCAINE HYDROCHLORIDE 7 ML: 10 INJECTION, SOLUTION EPIDURAL; INFILTRATION; INTRACAUDAL; PERINEURAL at 14:36

## 2025-01-02 ASSESSMENT — PAIN SCALES - GENERAL
PAINLEVEL_OUTOF10: 2
PAINLEVEL_OUTOF10: 3
PAINLEVEL_OUTOF10: 0 - NO PAIN
PAINLEVEL_OUTOF10: 3
PAINLEVEL_OUTOF10: 0 - NO PAIN
PAINLEVEL_OUTOF10: 0 - NO PAIN
PAINLEVEL_OUTOF10: 8
PAINLEVEL_OUTOF10: 0 - NO PAIN
PAINLEVEL_OUTOF10: 10 - WORST POSSIBLE PAIN
PAINLEVEL_OUTOF10: 0 - NO PAIN
PAINLEVEL_OUTOF10: 1
PAINLEVEL_OUTOF10: 3
PAINLEVEL_OUTOF10: 6
PAINLEVEL_OUTOF10: 0 - NO PAIN

## 2025-01-02 ASSESSMENT — PAIN DESCRIPTION - DESCRIPTORS
DESCRIPTORS: ACHING;DISCOMFORT;TENDER
DESCRIPTORS: DISCOMFORT;CRAMPING
DESCRIPTORS: ACHING;DISCOMFORT
DESCRIPTORS: ACHING;DISCOMFORT;SORE;TENDER

## 2025-01-02 ASSESSMENT — PAIN DESCRIPTION - ORIENTATION
ORIENTATION: LEFT;LOWER
ORIENTATION: MID;LOWER;RIGHT;LEFT

## 2025-01-02 ASSESSMENT — PAIN - FUNCTIONAL ASSESSMENT
PAIN_FUNCTIONAL_ASSESSMENT: 0-10

## 2025-01-02 ASSESSMENT — PAIN DESCRIPTION - LOCATION
LOCATION: ABDOMEN
LOCATION: ABDOMEN

## 2025-01-02 NOTE — PRE-PROCEDURE NOTE
Interventional Radiology Preprocedure Note    Indication for procedure: The primary encounter diagnosis was Pelvic abscess in female. Diagnoses of Diverticulitis and Left lower quadrant abdominal pain were also pertinent to this visit.    Relevant review of systems: NA    Relevant Labs:   Lab Results   Component Value Date    CREATININE 0.87 01/02/2025    EGFR 74 01/02/2025    INR 1.1 01/02/2025    PROTIME 11.2 01/02/2025       Planned Sedation/Anesthesia: Moderate    Airway assessment: normal    Directed physical examination:    GENERAL: awake, no acute distress  HEENT: AT/NC  NECK: supple  CV: RRR  PULM: non-labored breathing  ABDOMEN: ND  NEURO: AAOx3  MSK: full ROM  SKIN: no rash    Mallampati: II (hard and soft palate, upper portion of tonsils and uvula visible)    ASA Score: ASA 2 - Patient with mild systemic disease with no functional limitations    Benefits, risks and alternatives of procedure and planned sedation have been discussed with the patient and/or their representative. All questions answered and they agree to proceed.

## 2025-01-02 NOTE — NURSING NOTE
Admitted to [facility],  at bedside, pleasant, cooperative, aware of npo status after midnight, denies pain

## 2025-01-02 NOTE — PROGRESS NOTES
"Ivana Funes is a 64 y.o. female on day 1 of admission presenting with Pelvic abscess in female.    Subjective   No events overnight. Afebrile.   Objective     Physical Exam  Vitals and nursing note reviewed.   Constitutional:       Appearance: Normal appearance.   HENT:      Head: Normocephalic and atraumatic.   Cardiovascular:      Rate and Rhythm: Normal rate.   Pulmonary:      Effort: Pulmonary effort is normal.   Abdominal:      General: Abdomen is flat.      Palpations: Abdomen is soft.      Tenderness: There is abdominal tenderness.   Musculoskeletal:         General: No swelling or tenderness.   Skin:     General: Skin is warm and dry.   Neurological:      Mental Status: She is alert and oriented to person, place, and time.         Last Recorded Vitals  Blood pressure 103/71, pulse 62, temperature 36.9 °C (98.4 °F), temperature source Oral, resp. rate 20, height 1.651 m (5' 5\"), weight 78 kg (171 lb 15.3 oz), SpO2 99%.  Intake/Output last 3 Shifts:  I/O last 3 completed shifts:  In: 100 (1.3 mL/kg) [IV Piggyback:100]  Out: 400 (5.1 mL/kg) [Urine:400 (0.1 mL/kg/hr)]  Weight: 78 kg     Relevant Results  Lab Results   Component Value Date    GLUCOSE 92 01/02/2025    CALCIUM 7.9 (L) 01/02/2025     01/02/2025    K 3.6 01/02/2025    CO2 23 01/02/2025     (H) 01/02/2025    BUN 13 01/02/2025    CREATININE 0.87 01/02/2025     Lab Results   Component Value Date    WBC 7.8 01/02/2025    HGB 11.0 (L) 01/02/2025    HCT 32.3 (L) 01/02/2025    MCV 85 01/02/2025     01/02/2025     === 01/01/25 ===    CT ABDOMEN PELVIS W IV CONTRAST    - Impression -  Rim enhancing fluid collection in the pelvis concerning for abscess.  This is inseparable from the uterus, portions of the bladder, and  sigmoid colon. There is also loss of fat plane with the adjacent  small bowel loops. Stranding/inflammatory changes throughout the  pelvis. Findings are suggestive of sequelae of diverticulitis with  areas of fistula " formation not excluded.    Bowel wall thickening involving the sigmoid colon presumably related  to chronic changes of colitis however given the stranding in this  region superimposed acute colitis not excluded.    Colonic diverticulosis.    Signed by: Sandip Reynolds 1/1/2025 12:30 PM  Dictation workstation:   [id number]    Assessment/Plan   Assessment & Plan  Pelvic abscess in female    Diverticulitis of large intestine without bleeding    1/2: Awaiting IR drainage of abscess this afternoon. Continue IV antibiotics. AM Labs. OK for diet after procedure.   D/W Dr. Tamela HASTINGS spent 20 minutes in the professional and overall care of this patient.      Ashlee Chandra, APRN-CNP

## 2025-01-02 NOTE — NURSING NOTE
Patient returned to the floor form procedure. Drain and surgical bandage to lower left abdomen. Dressing dry, clean and intact.

## 2025-01-02 NOTE — CARE PLAN
The patient's goals for the shift include  complete procedure, pain management    The clinical goals for the shift include complete testing, prepare for IR intervention in am

## 2025-01-02 NOTE — NURSING NOTE
Assumed care of pt around this time.  Pt is alert, ambulating around her room during BSSR.   is at bedside.  Respirations even and unlabored on room air.  Pt denies any pain or needs at the moment.  Will be continuing to monitor.

## 2025-01-02 NOTE — POST-PROCEDURE NOTE
Interventional Radiology Brief Postprocedure Note    Attending: Sonido Miranda    Diagnosis: Pelvic abscess    Description of procedure: CT guided placement of a 10F pigtail catheter into the pelvic abscess. Approximately 60cc of barber pus was aspirated and the collection had near completely resolved on post procedure CT.     Anesthesia:  Local and MAC Moderate    Complications: None    Estimated Blood Loss: minimal    Medications  As of 01/02/25 1541      ketorolac (Toradol) injection 15 mg (mg) Total dose:  15 mg Dosing weight:  70.3      Date/Time Rate/Dose/Volume Action       01/01/25  1106 15 mg Given               sodium chloride 0.9 % bolus 500 mL (mL/hr) Total volume:  Not documented* Dosing weight:  70.3   *Total volume has not been documented. View each administration to see the amount administered.     Date/Time Rate/Dose/Volume Action       01/01/25  1129 500 mL - 500 mL/hr (over 60 min) New Bag      1237  (over 60 min) Stopped               iohexol (OMNIPaque) 350 mg iodine/mL solution 75 mL (mL) Total volume:  75 mL Dosing weight:  70.3      Date/Time Rate/Dose/Volume Action       01/01/25  1201 75 mL Given               piperacillin-tazobactam (Zosyn) 4.5 g in dextrose (iso)  mL (g) Total dose:  4.5 g Dosing weight:  70.3      Date/Time Rate/Dose/Volume Action       01/01/25  1304 4.5 g (over 30 min) New Bag      1437  (over 30 min) Stopped               lactated Ringer's infusion (mL/hr) Total volume:  2,068.33 mL* Dosing weight:  70.3   *From user-documented volume     Date/Time Rate/Dose/Volume Action       01/01/25  1736 100 mL/hr New Bag      2013 100 mL/hr New Bag     01/02/25  0919 100 mL/hr New Bag      1417 100 mL/hr - 2,068.33 mL Rate Verify               acetaminophen (Tylenol) tablet 650 mg (mg) Total dose:  650 mg Dosing weight:  70.3      Date/Time Rate/Dose/Volume Action       01/01/25  2316 650 mg Given               morphine injection 4 mg (mg) Total dose:  4 mg Dosing weight:   70.3      Date/Time Rate/Dose/Volume Action       01/02/25  1051 4 mg Given               piperacillin-tazobactam (Zosyn) 3.375 g in dextrose (iso) IV 50 mL (g) Total dose:  6.75 g* Dosing weight:  70.3   *From user-documented volume     Date/Time Rate/Dose/Volume Action       01/01/25  2013 3.375 g (over 30 min) New Bag      2109 50 mL Stopped     01/02/25  0226 3.375 g (over 30 min) New Bag      0315 50 mL Stopped      0919 3.375 g (over 30 min) New Bag      1040  (over 30 min) Stopped      1456 *3.375 g (over 30 min) Missed               fentaNYL PF (Sublimaze) injection (mcg) Total dose:  75 mcg      Date/Time Rate/Dose/Volume Action       01/02/25  1425 50 mcg Given      1446 25 mcg Given               midazolam (Versed) injection (mg) Total dose:  1.5 mg      Date/Time Rate/Dose/Volume Action       01/02/25  1426 1 mg Given      1446 0.5 mg Given               lidocaine PF (Xylocaine) 10 mg/mL (1 %) injection (mL) Total volume:  7 mL      Date/Time Rate/Dose/Volume Action       01/02/25  1436 7 mL Given                   Fluid sent for analysis.      See detailed result report with images in PACS.    The patient tolerated the procedure well without incident or complication and is in stable condition.

## 2025-01-02 NOTE — NURSING NOTE
Bed side shift report received. Patient resting comfortably. Reports no current concerns at this time. Call light within reach. This nurse assumed care. Patient has been NPO since midnight.

## 2025-01-02 NOTE — CARE PLAN
The patient's goals for the shift include      The clinical goals for the shift include complete testing, prepare for IR intervention in am    Over the shift, the patient did not make progress toward the following goals. Barriers to progression include pain. Recommendations to address these barriers include manage pain.

## 2025-01-02 NOTE — PROGRESS NOTES
"Ivana Funes is a 64 y.o. female on day 1 of admission presenting with Pelvic abscess in female.    Subjective   Patient resting in bed. Has some abdominal pain, frequent stools. Denies chest pain, shortness of breath, fevers, chills.        Objective     Physical Exam  Constitutional:       Appearance: Normal appearance.   HENT:      Head: Normocephalic and atraumatic.      Mouth/Throat:      Mouth: Mucous membranes are moist.      Pharynx: Oropharynx is clear.   Eyes:      Extraocular Movements: Extraocular movements intact.      Conjunctiva/sclera: Conjunctivae normal.      Pupils: Pupils are equal, round, and reactive to light.   Cardiovascular:      Rate and Rhythm: Normal rate and regular rhythm.      Pulses: Normal pulses.      Heart sounds: Normal heart sounds.   Pulmonary:      Effort: Pulmonary effort is normal.      Breath sounds: Normal breath sounds.   Abdominal:      General: Bowel sounds are normal.      Palpations: Abdomen is soft.      Tenderness: There is abdominal tenderness.   Musculoskeletal:         General: Normal range of motion.      Cervical back: Normal range of motion and neck supple.   Skin:     General: Skin is warm and dry.      Capillary Refill: Capillary refill takes less than 2 seconds.   Neurological:      General: No focal deficit present.      Mental Status: She is alert and oriented to person, place, and time.   Psychiatric:         Mood and Affect: Mood normal.         Behavior: Behavior normal.         Last Recorded Vitals  Blood pressure 128/61, pulse 58, temperature 37 °C (98.6 °F), temperature source Oral, resp. rate 18, height 1.651 m (5' 5\"), weight 78 kg (171 lb 15.3 oz), SpO2 100%.  Intake/Output last 3 Shifts:  I/O last 3 completed shifts:  In: 100 (1.3 mL/kg) [IV Piggyback:100]  Out: 400 (5.1 mL/kg) [Urine:400 (0.1 mL/kg/hr)]  Weight: 78 kg     Relevant Results  Results for orders placed or performed during the hospital encounter of 01/01/25 (from the past 24 " hours)   Protime-INR   Result Value Ref Range    Protime 11.2 9.3 - 12.7 seconds    INR 1.1 0.9 - 1.2   CBC   Result Value Ref Range    WBC 7.8 4.4 - 11.3 x10*3/uL    nRBC 0.0 0.0 - 0.0 /100 WBCs    RBC 3.82 (L) 4.00 - 5.20 x10*6/uL    Hemoglobin 11.0 (L) 12.0 - 16.0 g/dL    Hematocrit 32.3 (L) 36.0 - 46.0 %    MCV 85 80 - 100 fL    MCH 28.8 26.0 - 34.0 pg    MCHC 34.1 32.0 - 36.0 g/dL    RDW 12.4 11.5 - 14.5 %    Platelets 437 150 - 450 x10*3/uL   Comprehensive metabolic panel   Result Value Ref Range    Glucose 92 74 - 99 mg/dL    Sodium 138 136 - 145 mmol/L    Potassium 3.6 3.5 - 5.3 mmol/L    Chloride 108 (H) 98 - 107 mmol/L    Bicarbonate 23 21 - 32 mmol/L    Anion Gap 11 10 - 20 mmol/L    Urea Nitrogen 13 6 - 23 mg/dL    Creatinine 0.87 0.50 - 1.05 mg/dL    eGFR 74 >60 mL/min/1.73m*2    Calcium 7.9 (L) 8.6 - 10.3 mg/dL    Albumin 2.9 (L) 3.4 - 5.0 g/dL    Alkaline Phosphatase 54 33 - 136 U/L    Total Protein 6.2 (L) 6.4 - 8.2 g/dL    AST 31 9 - 39 U/L    Bilirubin, Total 0.3 0.0 - 1.2 mg/dL    ALT 21 7 - 45 U/L   Sterile Cup   Result Value Ref Range    Extra Tube Hold for add-ons.      CT guided abscess fluid collection drainage visceral Perq    Result Date: 1/2/2025  Interpreted By:  Isael Miranda, STUDY: CT GUIDED ABSCESS FLUID COLLECTION DRAINAGE VISCERAL PERQ;  1/2/2025 3:10 pm   INDICATION: Signs/Symptoms:Pelvic abscess drainage.   COMPARISON: None.   ACCESSION NUMBER(S): [id number]   ORDERING CLINICIAN: FOREST JOY   TECHNIQUE: INTERVENTIONALIST(S): Sonido Miranda MD   CONSENT: The patient/patient's POA/next of kin was informed of the nature of the proposed procedure. The purposes, alternatives, risks, and benefits were explained and discussed. All questions were answered and consent was obtained.   RADIATION EXPOSURE: DLP: 450.9 mGy*cm   SEDATION: Moderate conscious IV sedation services (supervision of administration, induction, and maintenance) were provided by the physician performing the  procedure with intravenous fentanyl 75 mcg and versed 1.5 mg for 41 minutes. The physician was assisted by an independent trained observer, an interventional radiology nurse, in the continuous monitoring of patient level of consciousness and physiologic status.   MEDICATION/CONTRAST: No additional   TIME OUT: A time out was performed immediately prior to procedure start with the interventional team, correctly identifying the patient name, date of birth, MRN, procedure, anatomy (including marking of site and side), patient position, procedure consent form, relevant laboratory and imaging test results, antibiotic administration, safety precautions, and procedure-specific equipment needs.   COMPLICATIONS: No immediate adverse events identified.   FINDINGS: Limited axial CT images were obtained through the abdomen/pelvis for the purposes of needle guidance were taken. The images redemonstrate the previously described abscess collection within the pelvis.   Patient was placed in supine position and prepped in the usual sterile manner. Lidocaine was used for local anesthesia. Utilizing intermittent CT guidance a 19G Yueh needle was used to access the fluid collection. A 035 wire was then used to maintain access in the collection after removal of the Yueh needle. Serial dilatation was had over the wire and an eventual 10 Syrian pigtail drainage catheter was placed in the collection. The pigtail drain was formed, connected to drain, and sutured/secured in place. 60 cc of barber pus was aspirated from the collection.  Fluid sample was sent to the laboratory for further analysis.   Postprocedure images demonstrated no evidence of hemorrhage and near-complete resolution of the previously seen collection.   The patient tolerated the procedure well without any immediate complications.       Uneventful CT guided 10 Syrian pigtail drainage catheter placement into the pelvic abscess, as detailed above.   Performed and dictated at  [facility].   MACRO: None   Signed by: Isael Miranda 1/2/2025 3:46 PM Dictation workstation:   [id number]    CT abdomen pelvis w IV contrast    Result Date: 1/1/2025  Interpreted By:  Sandip Reynolds, STUDY: CT ABDOMEN PELVIS W IV CONTRAST;  1/1/2025 12:07 pm   INDICATION: 63 y/o   F with  Signs/Symptoms:abdominal pain, hx of diverticulitis. concern for diverticulitis.   LIMITATIONS: None.   ACCESSION NUMBER(S): [id number]   ORDERING CLINICIAN: LUIS HSU   TECHNIQUE: After the administration of IV iodonated contrast, spiral axial images were obtained from the xiphoid down through the symphysis pubis. Sagittal and coronal reconstruction images were generated. 75 mL of Omnipaque 350.   COMPARISON: 11/07/2023   FINDINGS: Lower Chest: Scattered streaky atelectasis in the visualized lower lungs.   Liver: The liver is unremarkable without focal lesion.   Gallbladder and Biliary: Unremarkable.   Pancreas: No abnormality identified in the pancreas.   Spleen: No abnormality identified in the spleen.   Adrenals: No abnormality identified in either adrenal gland.   Urinary: Subcentimeter right renal hypodensity, too small to characterize. No hydronephrosis. Bladder wall thickening and adjacent stranding. The bladder is inseparable from the fluid collection along its superior aspect.   Gastrointestinal/Peritoneum: No small or large bowel obstruction in the visualized abdomen. In the abdomen, there is no extraluminal air. There is a 6 x 4.3 cm rim enhancing collection inseparable from the sigmoid colon and uterus concerning for abscess.. There are spiculations extending to other portions of sigmoid colon. There are also small bowel loops that run adjacent to this area of inflammation. Stranding is seen in the surrounding pelvic soft tissues. There is bowel wall thickening involving the sigmoid colon with surrounding fat stranding. Sigmoid colonic diverticulosis. Diverticula are  seen scattered diffusely throughout the colon. No evidence of acute appendicitis.   Vascular: Abdominal aorta is normal in caliber.   Lymphatics: No enlarged lymph nodes by size criteria.   MSK/Body Wall: No aggressive bony lesion identified. Degenerative changes at L5-S1.       Rim enhancing fluid collection in the pelvis concerning for abscess. This is inseparable from the uterus, portions of the bladder, and sigmoid colon. There is also loss of fat plane with the adjacent small bowel loops. Stranding/inflammatory changes throughout the pelvis. Findings are suggestive of sequelae of diverticulitis with areas of fistula formation not excluded.   Bowel wall thickening involving the sigmoid colon presumably related to chronic changes of colitis however given the stranding in this region superimposed acute colitis not excluded.   Colonic diverticulosis.   Signed by: Sandip Reynolds 1/1/2025 12:30 PM Dictation workstation:   [id number]       Assessment/Plan   Assessment & Plan  Pelvic abscess in female  CT scan reveals rim enhancing fluid collection. Suggestive of diverticulitis with areas of fistula formation   Consult to IR for drainage   - NPO after midnight   - Plan for today   IV antibiotics   Pain management per primary service   IV fluids   Diverticulitis of large intestine without bleeding  Pain management per primary service   IV antibiotics per primary service   Plan for abscess drainage with IR today     DVT prophylaxis   SCDs - low risk     Nory Cox, APRN-CNP

## 2025-01-03 ENCOUNTER — PHARMACY VISIT (OUTPATIENT)
Dept: PHARMACY | Facility: CLINIC | Age: 65
End: 2025-01-03
Payer: COMMERCIAL

## 2025-01-03 VITALS
HEART RATE: 87 BPM | SYSTOLIC BLOOD PRESSURE: 124 MMHG | HEIGHT: 65 IN | BODY MASS INDEX: 28.65 KG/M2 | DIASTOLIC BLOOD PRESSURE: 50 MMHG | OXYGEN SATURATION: 100 % | TEMPERATURE: 98.4 F | RESPIRATION RATE: 17 BRPM | WEIGHT: 171.96 LBS

## 2025-01-03 LAB
ALBUMIN SERPL BCP-MCNC: 2.8 G/DL (ref 3.4–5)
ALP SERPL-CCNC: 81 U/L (ref 33–136)
ALT SERPL W P-5'-P-CCNC: 19 U/L (ref 7–45)
ANION GAP SERPL CALCULATED.3IONS-SCNC: 11 MMOL/L (ref 10–20)
AST SERPL W P-5'-P-CCNC: 22 U/L (ref 9–39)
BILIRUB SERPL-MCNC: 0.4 MG/DL (ref 0–1.2)
BUN SERPL-MCNC: 9 MG/DL (ref 6–23)
CALCIUM SERPL-MCNC: 7.9 MG/DL (ref 8.6–10.3)
CHLORIDE SERPL-SCNC: 105 MMOL/L (ref 98–107)
CO2 SERPL-SCNC: 23 MMOL/L (ref 21–32)
CREAT SERPL-MCNC: 0.81 MG/DL (ref 0.5–1.05)
EGFRCR SERPLBLD CKD-EPI 2021: 81 ML/MIN/1.73M*2
ERYTHROCYTE [DISTWIDTH] IN BLOOD BY AUTOMATED COUNT: 12.5 % (ref 11.5–14.5)
GLUCOSE SERPL-MCNC: 105 MG/DL (ref 74–99)
HCT VFR BLD AUTO: 32.1 % (ref 36–46)
HGB BLD-MCNC: 10.7 G/DL (ref 12–16)
MCH RBC QN AUTO: 28.8 PG (ref 26–34)
MCHC RBC AUTO-ENTMCNC: 33.3 G/DL (ref 32–36)
MCV RBC AUTO: 87 FL (ref 80–100)
NRBC BLD-RTO: 0 /100 WBCS (ref 0–0)
PLATELET # BLD AUTO: 459 X10*3/UL (ref 150–450)
POTASSIUM SERPL-SCNC: 3.6 MMOL/L (ref 3.5–5.3)
PROT SERPL-MCNC: 6.3 G/DL (ref 6.4–8.2)
RBC # BLD AUTO: 3.71 X10*6/UL (ref 4–5.2)
SODIUM SERPL-SCNC: 135 MMOL/L (ref 136–145)
WBC # BLD AUTO: 11.2 X10*3/UL (ref 4.4–11.3)

## 2025-01-03 PROCEDURE — 2500000001 HC RX 250 WO HCPCS SELF ADMINISTERED DRUGS (ALT 637 FOR MEDICARE OP)

## 2025-01-03 PROCEDURE — 2500000004 HC RX 250 GENERAL PHARMACY W/ HCPCS (ALT 636 FOR OP/ED)

## 2025-01-03 PROCEDURE — 85027 COMPLETE CBC AUTOMATED: CPT | Performed by: SURGERY

## 2025-01-03 PROCEDURE — 84075 ASSAY ALKALINE PHOSPHATASE: CPT | Performed by: SURGERY

## 2025-01-03 PROCEDURE — RXMED WILLOW AMBULATORY MEDICATION CHARGE

## 2025-01-03 PROCEDURE — 2500000001 HC RX 250 WO HCPCS SELF ADMINISTERED DRUGS (ALT 637 FOR MEDICARE OP): Performed by: SURGERY

## 2025-01-03 PROCEDURE — 2500000004 HC RX 250 GENERAL PHARMACY W/ HCPCS (ALT 636 FOR OP/ED): Performed by: SURGERY

## 2025-01-03 PROCEDURE — 36415 COLL VENOUS BLD VENIPUNCTURE: CPT | Performed by: SURGERY

## 2025-01-03 PROCEDURE — 99231 SBSQ HOSP IP/OBS SF/LOW 25: CPT | Performed by: NURSE PRACTITIONER

## 2025-01-03 RX ORDER — OXYCODONE HYDROCHLORIDE 5 MG/1
5 TABLET ORAL EVERY 6 HOURS PRN
Qty: 10 TABLET | Refills: 0 | Status: SHIPPED | OUTPATIENT
Start: 2025-01-03 | End: 2025-01-06

## 2025-01-03 RX ADMIN — ACETAMINOPHEN 650 MG: 325 TABLET ORAL at 12:20

## 2025-01-03 RX ADMIN — ACETAMINOPHEN 650 MG: 325 TABLET ORAL at 09:34

## 2025-01-03 RX ADMIN — PIPERACILLIN SODIUM AND TAZOBACTAM SODIUM 3.38 G: 3; .375 INJECTION, SOLUTION INTRAVENOUS at 09:34

## 2025-01-03 RX ADMIN — KETOROLAC TROMETHAMINE 15 MG: 30 INJECTION, SOLUTION INTRAMUSCULAR at 04:36

## 2025-01-03 RX ADMIN — ACETAMINOPHEN 650 MG: 325 TABLET ORAL at 04:36

## 2025-01-03 RX ADMIN — KETOROLAC TROMETHAMINE 15 MG: 30 INJECTION, SOLUTION INTRAMUSCULAR at 09:34

## 2025-01-03 RX ADMIN — OXYCODONE 5 MG: 5 TABLET ORAL at 00:26

## 2025-01-03 RX ADMIN — ACETAMINOPHEN 650 MG: 325 TABLET ORAL at 00:26

## 2025-01-03 RX ADMIN — PIPERACILLIN SODIUM AND TAZOBACTAM SODIUM 3.38 G: 3; .375 INJECTION, SOLUTION INTRAVENOUS at 02:07

## 2025-01-03 RX ADMIN — ONDANSETRON 4 MG: 4 TABLET, ORALLY DISINTEGRATING ORAL at 10:13

## 2025-01-03 ASSESSMENT — COGNITIVE AND FUNCTIONAL STATUS - GENERAL
DAILY ACTIVITIY SCORE: 24
MOBILITY SCORE: 24

## 2025-01-03 ASSESSMENT — PAIN DESCRIPTION - DESCRIPTORS: DESCRIPTORS: ACHING;DISCOMFORT

## 2025-01-03 ASSESSMENT — PAIN - FUNCTIONAL ASSESSMENT
PAIN_FUNCTIONAL_ASSESSMENT: FLACC (FACE, LEGS, ACTIVITY, CRY, CONSOLABILITY)
PAIN_FUNCTIONAL_ASSESSMENT: FLACC (FACE, LEGS, ACTIVITY, CRY, CONSOLABILITY)
PAIN_FUNCTIONAL_ASSESSMENT: 0-10

## 2025-01-03 ASSESSMENT — PAIN DESCRIPTION - LOCATION: LOCATION: ABDOMEN

## 2025-01-03 ASSESSMENT — PAIN SCALES - GENERAL
PAINLEVEL_OUTOF10: 0 - NO PAIN
PAINLEVEL_OUTOF10: 7
PAINLEVEL_OUTOF10: 7

## 2025-01-03 ASSESSMENT — PAIN DESCRIPTION - ORIENTATION: ORIENTATION: LEFT;LOWER

## 2025-01-03 NOTE — CARE PLAN
The patient's goals for the shift include      The clinical goals for the shift include pain control    Over the shift, the patient did not make progress toward the following goals. Barriers to progression include n/a. Recommendations to address these barriers include n/a.

## 2025-01-03 NOTE — NURSING NOTE
This nurse inquired about IV fluid order. Per Ashlee Chandra, she instructed  this nurse to complete current bag of LR that is infusing then stop.

## 2025-01-03 NOTE — ASSESSMENT & PLAN NOTE
CT scan reveals rim enhancing fluid collection. Suggestive of diverticulitis with areas of fistula formation   Consult to IR for drainage   - S/P CT guided drain placement. ~60 ml pus removed during procedure. Drain to gravity drainage   Oral antibiotics   Follow up with surgery   Pain management per primary service

## 2025-01-03 NOTE — CARE PLAN
The patient's goals for the shift include      The clinical goals for the shift include pain control    Over the shift, the patient did not make progress toward the following goals. Barriers to progression include S/p abscess drainage with drain placement. Recommendations to address these barriers include administer medications/interventions as ordered.

## 2025-01-03 NOTE — PROGRESS NOTES
Ivana Funes is a 64 y.o. female on day 2 of admission presenting with Pelvic abscess in female.    Subjective   Feels okay just a little sore at the left lower quadrant drainage site.  Feels better than yesterday. Abdominal pain/soreness is just at the left lower quadrant drainage site and is better than yesterday that she rates 1 when she is still and a 4-5 when she is moving.  Taking p.o. okay.  No nausea or vomiting.  Passing gas yesterday but none today.  Moved her bowels yesterday.  No angina, shortness of breath, cough, sputum, bleeding, voiding problems/dysuria, chills.  Ambulating.     Objective     Vital signs in last 24 hours:  Temp:  [36.5 °C (97.7 °F)-37 °C (98.6 °F)] 37 °C (98.6 °F)  Heart Rate:  [58-75] 70  Resp:  [13-24] 16  BP: ()/(51-71) 106/57  Heart Rate:  [58-75]   Temp:  [36.5 °C (97.7 °F)-37 °C (98.6 °F)]   Resp:  [13-24]   BP: ()/(51-71)   SpO2:  [97 %-100 %]      Intake/Output last 3 Shifts:  I/O last 3 completed shifts:  In: 2971.7 (38.1 mL/kg) [P.O.:440; I.V.:2331.7 (29.9 mL/kg); IV Piggyback:200]  Out: 700 (9 mL/kg) [Urine:700 (0.2 mL/kg/hr)]  Weight: 78 kg     Physical Exam  No acute distress  Not septic appearing  Looks fine and comfortable  Alert and oriented  Heart regular  Lungs clear  No edema  Abdomen soft, positive bowel sounds, nondistended, nontender except for only left lower quadrant where her drain is that the patient rates a 6 with no guarding or rebound and no abdominal pain when I shake her abdomen a little bit  Left lower quadrant drain-site without any rbqemy-jzemlnl-vcdewjpp old bloody purulent    Scheduled medications  acetaminophen, 650 mg, oral, q4h  ketorolac, 15 mg, intravenous, q6h  piperacillin-tazobactam, 3.375 g, intravenous, q6h    Continuous medications     PRN medications  PRN medications: morphine, naloxone, ondansetron ODT **OR** ondansetron, oxyCODONE    Relevant Results  Results from last 7 days   Lab Units 01/03/25  0521 01/02/25  0524  01/01/25  1100   WBC AUTO x10*3/uL 11.2 7.8 8.6   HEMOGLOBIN g/dL 10.7* 11.0* 12.3   HEMATOCRIT % 32.1* 32.3* 36.2   PLATELETS AUTO x10*3/uL 459* 437 504*      Results from last 7 days   Lab Units 01/03/25  0521 01/02/25  0524 01/01/25  1100   SODIUM mmol/L 135* 138 135*   POTASSIUM mmol/L 3.6 3.6 3.9   CHLORIDE mmol/L 105 108* 105   CO2 mmol/L 23 23 21   BUN mg/dL 9 13 15   CREATININE mg/dL 0.81 0.87 0.86   GLUCOSE mg/dL 105* 92 102*   CALCIUM mg/dL 7.9* 7.9* 8.7      CT guided abscess fluid collection drainage visceral Perq    Result Date: 1/2/2025  Interpreted By:  Isael Miranda, STUDY: CT GUIDED ABSCESS FLUID COLLECTION DRAINAGE VISCERAL PERQ;  1/2/2025 3:10 pm   INDICATION: Signs/Symptoms:Pelvic abscess drainage.   COMPARISON: None.   ACCESSION NUMBER(S): [id number]   ORDERING CLINICIAN: FOREST JOY   TECHNIQUE: INTERVENTIONALIST(S): Sonido Miranda MD   CONSENT: The patient/patient's POA/next of kin was informed of the nature of the proposed procedure. The purposes, alternatives, risks, and benefits were explained and discussed. All questions were answered and consent was obtained.   RADIATION EXPOSURE: DLP: 450.9 mGy*cm   SEDATION: Moderate conscious IV sedation services (supervision of administration, induction, and maintenance) were provided by the physician performing the procedure with intravenous fentanyl 75 mcg and versed 1.5 mg for 41 minutes. The physician was assisted by an independent trained observer, an interventional radiology nurse, in the continuous monitoring of patient level of consciousness and physiologic status.   MEDICATION/CONTRAST: No additional   TIME OUT: A time out was performed immediately prior to procedure start with the interventional team, correctly identifying the patient name, date of birth, MRN, procedure, anatomy (including marking of site and side), patient position, procedure consent form, relevant laboratory and imaging test results, antibiotic administration,  safety precautions, and procedure-specific equipment needs.   COMPLICATIONS: No immediate adverse events identified.   FINDINGS: Limited axial CT images were obtained through the abdomen/pelvis for the purposes of needle guidance were taken. The images redemonstrate the previously described abscess collection within the pelvis.   Patient was placed in supine position and prepped in the usual sterile manner. Lidocaine was used for local anesthesia. Utilizing intermittent CT guidance a 19G Yueh needle was used to access the fluid collection. A 035 wire was then used to maintain access in the collection after removal of the Yueh needle. Serial dilatation was had over the wire and an eventual 10 Guamanian pigtail drainage catheter was placed in the collection. The pigtail drain was formed, connected to drain, and sutured/secured in place. 60 cc of barber pus was aspirated from the collection.  Fluid sample was sent to the laboratory for further analysis.   Postprocedure images demonstrated no evidence of hemorrhage and near-complete resolution of the previously seen collection.   The patient tolerated the procedure well without any immediate complications.       Uneventful CT guided 10 Guamanian pigtail drainage catheter placement into the pelvic abscess, as detailed above.   Performed and dictated at [facility].   MACRO: None   Signed by: Isael Miranda 1/2/2025 3:46 PM Dictation workstation:   [id number]    CT abdomen pelvis w IV contrast    Result Date: 1/1/2025  Interpreted By:  Sandip Reynolds, STUDY: CT ABDOMEN PELVIS W IV CONTRAST;  1/1/2025 12:07 pm   INDICATION: 63 y/o   F with  Signs/Symptoms:abdominal pain, hx of diverticulitis. concern for diverticulitis.   LIMITATIONS: None.   ACCESSION NUMBER(S): [id number]   ORDERING CLINICIAN: LUIS HSU   TECHNIQUE: After the administration of IV iodonated contrast, spiral axial images were obtained from the xiphoid down  through the symphysis pubis. Sagittal and coronal reconstruction images were generated. 75 mL of Omnipaque 350.   COMPARISON: 11/07/2023   FINDINGS: Lower Chest: Scattered streaky atelectasis in the visualized lower lungs.   Liver: The liver is unremarkable without focal lesion.   Gallbladder and Biliary: Unremarkable.   Pancreas: No abnormality identified in the pancreas.   Spleen: No abnormality identified in the spleen.   Adrenals: No abnormality identified in either adrenal gland.   Urinary: Subcentimeter right renal hypodensity, too small to characterize. No hydronephrosis. Bladder wall thickening and adjacent stranding. The bladder is inseparable from the fluid collection along its superior aspect.   Gastrointestinal/Peritoneum: No small or large bowel obstruction in the visualized abdomen. In the abdomen, there is no extraluminal air. There is a 6 x 4.3 cm rim enhancing collection inseparable from the sigmoid colon and uterus concerning for abscess.. There are spiculations extending to other portions of sigmoid colon. There are also small bowel loops that run adjacent to this area of inflammation. Stranding is seen in the surrounding pelvic soft tissues. There is bowel wall thickening involving the sigmoid colon with surrounding fat stranding. Sigmoid colonic diverticulosis. Diverticula are seen scattered diffusely throughout the colon. No evidence of acute appendicitis.   Vascular: Abdominal aorta is normal in caliber.   Lymphatics: No enlarged lymph nodes by size criteria.   MSK/Body Wall: No aggressive bony lesion identified. Degenerative changes at L5-S1.       Rim enhancing fluid collection in the pelvis concerning for abscess. This is inseparable from the uterus, portions of the bladder, and sigmoid colon. There is also loss of fat plane with the adjacent small bowel loops. Stranding/inflammatory changes throughout the pelvis. Findings are suggestive of sequelae of diverticulitis with areas of fistula  formation not excluded.   Bowel wall thickening involving the sigmoid colon presumably related to chronic changes of colitis however given the stranding in this region superimposed acute colitis not excluded.   Colonic diverticulosis.   Signed by: Sandip Reynolds 1/1/2025 12:30 PM Dictation workstation:   [id number]      Assessment/Plan   Assessment & Plan  Pelvic abscess in female    Diverticulitis of large intestine without bleeding      For both of the above A/P's:    1/1 CT as above  S/p IR drainage 1/2-continue with drain-discharge instructions given-cultures pending with 3+ moderate polymorphonuclear leukocytes so far  VSS  Bicarb 23 same  WBC 11.2 from 7.8  Scheduled Tylenol and Toradol  Zosyn  As needed pain/nausea medication  Regular diet  I-S  Morning CBC and CMP already ordered  Upon discharge follow-up with Dr. Madera in 1 week  Will discuss with Dr. Rapp    Hyponatremia  Treatment if any per IM    Hypocalcemia  Treatment if any per IM    Hypoalbuminemia  Continue with regular diet    DVT prophylaxis  Ambulate  SCDs  Will discuss with surgeon anticoagulate if patient not discharged later today      BERTO Moran-CNP

## 2025-01-03 NOTE — DISCHARGE INSTRUCTIONS
Continue to use your incentive spirometer  Frequently move your feet all the way up and all the way down  During the day you should be out of bed, sitting in the chair, and walking around every 1-2 hours  Shower at least daily   No tub baths/swimming/incisions under water for now  Stairs as tolerated  Activity as tolerated   No driving while on pain meds  Medication as prescribed to maintain comfort level  Resume home diet with prioritizing liquids over solids  Follow up with us in 1 week regardless and call sooner if any issues    Measure and record drain output at least daily and also as needed-salo zero if zero-bring amounts with you to office appt  After showering daily paint drain insertion site with betadine and cover with new clean dressing and keep covered/dressed-changing at least daily  Drain must be indented at all times to have suction  Make sure drain is always supported-pin, pocket, string, etc, and not just hanging

## 2025-01-03 NOTE — NURSING NOTE
End of shift, bedside shift report given. Patient laying in bed resting comfortably, with call light within reach. Dressing to lower left abdomen, dry, clean and intact. Drain in place with minimal drainage. Patient reports a reduction in pain with ice pack and with recent administration of pain medication.  Patient verbalizes no new concerns at this time. Bed alarm active. Bed at lowest position.    EMS

## 2025-01-03 NOTE — PROGRESS NOTES
"Ivana Funes is a 64 y.o. female on day 2 of admission presenting with Pelvic abscess in female.    Subjective   Patient sitting up in chair at bedside, eating lunch. States she has lower abdominal pain still. Denies chest pain, shortness of breath, fevers, chills. Hopeful to go home today        Objective     Physical Exam  Constitutional:       Appearance: Normal appearance.   HENT:      Head: Normocephalic and atraumatic.      Mouth/Throat:      Mouth: Mucous membranes are moist.      Pharynx: Oropharynx is clear.   Eyes:      Extraocular Movements: Extraocular movements intact.      Conjunctiva/sclera: Conjunctivae normal.      Pupils: Pupils are equal, round, and reactive to light.   Cardiovascular:      Rate and Rhythm: Normal rate and regular rhythm.      Pulses: Normal pulses.      Heart sounds: Normal heart sounds.   Pulmonary:      Effort: Pulmonary effort is normal.      Breath sounds: Normal breath sounds.   Abdominal:      General: Bowel sounds are normal.      Palpations: Abdomen is soft.      Tenderness: There is abdominal tenderness.   Musculoskeletal:         General: Normal range of motion.      Cervical back: Neck supple.   Skin:     General: Skin is warm and dry.      Capillary Refill: Capillary refill takes less than 2 seconds.   Neurological:      General: No focal deficit present.      Mental Status: She is alert.         Last Recorded Vitals  Blood pressure 124/50, pulse 87, temperature 36.9 °C (98.4 °F), temperature source Oral, resp. rate 17, height 1.651 m (5' 5\"), weight 78 kg (171 lb 15.3 oz), SpO2 100%.  Intake/Output last 3 Shifts:  I/O last 3 completed shifts:  In: 2971.7 (38.1 mL/kg) [P.O.:440; I.V.:2331.7 (29.9 mL/kg); IV Piggyback:200]  Out: 700 (9 mL/kg) [Urine:700 (0.2 mL/kg/hr)]  Weight: 78 kg     Relevant Results  Results for orders placed or performed during the hospital encounter of 01/01/25 (from the past 24 hours)   Sterile Cup   Result Value Ref Range    Extra Tube " Hold for add-ons.    Sterile Fluid Culture/Smear    Specimen: Aspirate; Fluid   Result Value Ref Range    Sterile Fluid Culture/Smear Culture in progress     Gram Stain (3+) Moderate Polymorphonuclear leukocytes     Gram Stain No organisms seen    CBC   Result Value Ref Range    WBC 11.2 4.4 - 11.3 x10*3/uL    nRBC 0.0 0.0 - 0.0 /100 WBCs    RBC 3.71 (L) 4.00 - 5.20 x10*6/uL    Hemoglobin 10.7 (L) 12.0 - 16.0 g/dL    Hematocrit 32.1 (L) 36.0 - 46.0 %    MCV 87 80 - 100 fL    MCH 28.8 26.0 - 34.0 pg    MCHC 33.3 32.0 - 36.0 g/dL    RDW 12.5 11.5 - 14.5 %    Platelets 459 (H) 150 - 450 x10*3/uL   Comprehensive metabolic panel   Result Value Ref Range    Glucose 105 (H) 74 - 99 mg/dL    Sodium 135 (L) 136 - 145 mmol/L    Potassium 3.6 3.5 - 5.3 mmol/L    Chloride 105 98 - 107 mmol/L    Bicarbonate 23 21 - 32 mmol/L    Anion Gap 11 10 - 20 mmol/L    Urea Nitrogen 9 6 - 23 mg/dL    Creatinine 0.81 0.50 - 1.05 mg/dL    eGFR 81 >60 mL/min/1.73m*2    Calcium 7.9 (L) 8.6 - 10.3 mg/dL    Albumin 2.8 (L) 3.4 - 5.0 g/dL    Alkaline Phosphatase 81 33 - 136 U/L    Total Protein 6.3 (L) 6.4 - 8.2 g/dL    AST 22 9 - 39 U/L    Bilirubin, Total 0.4 0.0 - 1.2 mg/dL    ALT 19 7 - 45 U/L     CT guided abscess fluid collection drainage visceral Perq    Result Date: 1/2/2025  Interpreted By:  Isael Miranda, STUDY: CT GUIDED ABSCESS FLUID COLLECTION DRAINAGE VISCERAL PERQ;  1/2/2025 3:10 pm   INDICATION: Signs/Symptoms:Pelvic abscess drainage.   COMPARISON: None.   ACCESSION NUMBER(S): [id number]   ORDERING CLINICIAN: FOREST JOY   TECHNIQUE: INTERVENTIONALIST(S): Sonido Miranda MD   CONSENT: The patient/patient's POA/next of kin was informed of the nature of the proposed procedure. The purposes, alternatives, risks, and benefits were explained and discussed. All questions were answered and consent was obtained.   RADIATION EXPOSURE: DLP: 450.9 mGy*cm   SEDATION: Moderate conscious IV sedation services (supervision of  administration, induction, and maintenance) were provided by the physician performing the procedure with intravenous fentanyl 75 mcg and versed 1.5 mg for 41 minutes. The physician was assisted by an independent trained observer, an interventional radiology nurse, in the continuous monitoring of patient level of consciousness and physiologic status.   MEDICATION/CONTRAST: No additional   TIME OUT: A time out was performed immediately prior to procedure start with the interventional team, correctly identifying the patient name, date of birth, MRN, procedure, anatomy (including marking of site and side), patient position, procedure consent form, relevant laboratory and imaging test results, antibiotic administration, safety precautions, and procedure-specific equipment needs.   COMPLICATIONS: No immediate adverse events identified.   FINDINGS: Limited axial CT images were obtained through the abdomen/pelvis for the purposes of needle guidance were taken. The images redemonstrate the previously described abscess collection within the pelvis.   Patient was placed in supine position and prepped in the usual sterile manner. Lidocaine was used for local anesthesia. Utilizing intermittent CT guidance a 19G Yueh needle was used to access the fluid collection. A 035 wire was then used to maintain access in the collection after removal of the Yueh needle. Serial dilatation was had over the wire and an eventual 10 Yakut pigtail drainage catheter was placed in the collection. The pigtail drain was formed, connected to drain, and sutured/secured in place. 60 cc of barber pus was aspirated from the collection.  Fluid sample was sent to the laboratory for further analysis.   Postprocedure images demonstrated no evidence of hemorrhage and near-complete resolution of the previously seen collection.   The patient tolerated the procedure well without any immediate complications.       Uneventful CT guided 10 Yakut pigtail drainage  catheter placement into the pelvic abscess, as detailed above.   Performed and dictated at [facility].   MACRO: None   Signed by: Isael Miranda 1/2/2025 3:46 PM Dictation workstation:   [id number]       Assessment/Plan   Assessment & Plan  Pelvic abscess in female  CT scan reveals rim enhancing fluid collection. Suggestive of diverticulitis with areas of fistula formation   Consult to IR for drainage   - S/P CT guided drain placement. ~60 ml pus removed during procedure. Drain to gravity drainage   Oral antibiotics   Follow up with surgery   Pain management per primary service     Diverticulitis of large intestine without bleeding  Pain management per primary service   IV antibiotics per primary service - Augmentin at discharge   Follow up with surgeon as instructed    DVT prophylaxis   Low risk - SCDs, encourage ambulation     Will sign off. Please call if any questions or re-consult needed.  Thank you for the consult     Nory Cox, APRN-CNP

## 2025-01-03 NOTE — PROGRESS NOTES
Spiritual Care Visit  Spiritual Care Request    Reason for Visit:  Routine Visit: Introduction     Request Received From:       Focus of Care:  Visited With: Patient         Refer to :          Spiritual Care Assessment    Spiritual Assessment:                      Care Provided:  Intended Effects: Establish rapport and connectedness, Build relationship of care and support, Demonstrate caring and concern, Lessen someone's feelings of isolation  Methods: Offer emotional support  Interventions: Explain  role    Sense of Community and or Episcopalian Affiliation:  None         Addressed Needs/Concerns and/or Shanna Through:          Outcome:  Outcome of Spiritual Care Visit: Identifying spiritual/emotional issues, Identifying patient's strengths/source of hope     Advance Directives:         Spiritual Care Annotation      Annotation: provided patient support while rounding the Unit.  introduced  services of [facility].    explained the role of the  in providing emotional and spiritual support for patient's and family while in admitted to the hospital. Patient appears to be comfortable.  offered emotional support to keep her from isolation.  No spiritual or L[employer] needs were expressed. Spiritual care will remain available for support as requested.

## 2025-01-03 NOTE — PROGRESS NOTES
01/03/25 1420   Discharge Planning   Expected Discharge Disposition Home   Does the patient need discharge transport arranged? No     Patient with active discharge order.  Per chart review, no skilled needs identified.

## 2025-01-03 NOTE — ASSESSMENT & PLAN NOTE
Pain management per primary service   IV antibiotics per primary service - Augmentin at discharge   Follow up with surgeon as instructed

## 2025-01-05 LAB
B-LACTAMASE ORGANISM ISLT: POSITIVE
BACTERIA FLD CULT: ABNORMAL
BACTERIA FLD CULT: ABNORMAL
GRAM STN SPEC: ABNORMAL
GRAM STN SPEC: ABNORMAL

## 2025-01-07 ENCOUNTER — TELEPHONE (OUTPATIENT)
Dept: SURGERY | Facility: CLINIC | Age: 65
End: 2025-01-07
Payer: COMMERCIAL

## 2025-01-07 NOTE — TELEPHONE ENCOUNTER
Called in regarding FMLA.. Spoke with Dr. Rapp in regards to this. Recommended to bring the FMLA to Dr. Madera at the follow up on Friday 1/10/25. Will reach out to inform the patient

## 2025-01-09 PROBLEM — R23.2 FLUSHING: Status: ACTIVE | Noted: 2025-01-09

## 2025-01-09 PROBLEM — R10.32 LEFT LOWER QUADRANT ABDOMINAL PAIN: Status: ACTIVE | Noted: 2025-01-09

## 2025-01-09 PROBLEM — J96.90 RESPIRATORY FAILURE (MULTI): Status: ACTIVE | Noted: 2025-01-09
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
## 2025-01-09 PROBLEM — K57.92 DIVERTICULITIS OF INTESTINE: Status: ACTIVE | Noted: 2023-11-22

## 2025-01-09 PROBLEM — M25.519 SHOULDER PAIN: Status: ACTIVE | Noted: 2025-01-09

## 2025-01-10 ENCOUNTER — OFFICE VISIT (OUTPATIENT)
Dept: SURGERY | Facility: CLINIC | Age: 65
End: 2025-01-10
Payer: COMMERCIAL

## 2025-01-10 VITALS
BODY MASS INDEX: 25.52 KG/M2 | TEMPERATURE: 97.6 F | DIASTOLIC BLOOD PRESSURE: 60 MMHG | WEIGHT: 153.2 LBS | SYSTOLIC BLOOD PRESSURE: 110 MMHG | HEIGHT: 65 IN | HEART RATE: 74 BPM | OXYGEN SATURATION: 99 %

## 2025-01-10 DIAGNOSIS — K57.20 DIVERTICULITIS OF LARGE INTESTINE WITH ABSCESS WITHOUT BLEEDING: Primary | ICD-10-CM

## 2025-01-10 PROCEDURE — 1036F TOBACCO NON-USER: CPT | Performed by: STUDENT IN AN ORGANIZED HEALTH CARE EDUCATION/TRAINING PROGRAM

## 2025-01-10 PROCEDURE — 1111F DSCHRG MED/CURRENT MED MERGE: CPT | Performed by: STUDENT IN AN ORGANIZED HEALTH CARE EDUCATION/TRAINING PROGRAM

## 2025-01-10 PROCEDURE — 1159F MED LIST DOCD IN RCRD: CPT | Performed by: STUDENT IN AN ORGANIZED HEALTH CARE EDUCATION/TRAINING PROGRAM

## 2025-01-10 PROCEDURE — 3008F BODY MASS INDEX DOCD: CPT | Performed by: STUDENT IN AN ORGANIZED HEALTH CARE EDUCATION/TRAINING PROGRAM

## 2025-01-10 PROCEDURE — 99214 OFFICE O/P EST MOD 30 MIN: CPT | Performed by: STUDENT IN AN ORGANIZED HEALTH CARE EDUCATION/TRAINING PROGRAM

## 2025-01-10 PROCEDURE — 1126F AMNT PAIN NOTED NONE PRSNT: CPT | Performed by: STUDENT IN AN ORGANIZED HEALTH CARE EDUCATION/TRAINING PROGRAM

## 2025-01-10 RX ORDER — IBUPROFEN 200 MG
200 TABLET ORAL EVERY 6 HOURS PRN
COMMUNITY

## 2025-01-10 RX ORDER — ACETAMINOPHEN 500 MG
500 TABLET ORAL EVERY 6 HOURS PRN
COMMUNITY

## 2025-01-10 ASSESSMENT — ENCOUNTER SYMPTOMS
FEVER: 0
SHORTNESS OF BREATH: 0
PALPITATIONS: 0
VOICE CHANGE: 0
BRUISES/BLEEDS EASILY: 0
ABDOMINAL PAIN: 0
FACIAL ASYMMETRY: 0
HEMATURIA: 0
CHEST TIGHTNESS: 0
HEADACHES: 0
TROUBLE SWALLOWING: 0
NAUSEA: 1
SORE THROAT: 0
SPEECH DIFFICULTY: 0
ARTHRALGIAS: 0
CHILLS: 0
WOUND: 0
VOMITING: 0
UNEXPECTED WEIGHT CHANGE: 0
DIARRHEA: 0
ADENOPATHY: 0
BLOOD IN STOOL: 0
DYSURIA: 0

## 2025-01-10 ASSESSMENT — PAIN SCALES - GENERAL: PAINLEVEL_OUTOF10: 0-NO PAIN

## 2025-01-10 ASSESSMENT — PATIENT HEALTH QUESTIONNAIRE - PHQ9
1. LITTLE INTEREST OR PLEASURE IN DOING THINGS: NOT AT ALL
2. FEELING DOWN, DEPRESSED OR HOPELESS: NOT AT ALL
SUM OF ALL RESPONSES TO PHQ9 QUESTIONS 1 AND 2: 0

## 2025-01-10 NOTE — PATIENT INSTRUCTIONS
Radiology / Laboratory  Testing Instructions     The doctor has referred you to [facility] Radiology or Laboratory for additional testing. In order for us to better assist you in the process, please follow these instructions.     Please call today to schedule your Diagnostic Imaging at [phone].   If your order is for lab work, please take your requisition to the nearest [facility] Laboratory to obtain testing.      Please call our office at [phone]  once imaging is scheduled to make a follow up appointment to discuss results.       Thank you for allowing us to care for you!

## 2025-01-10 NOTE — PROGRESS NOTES
History Of Present Illness  Ivana Funes is a 64 y.o. female presenting for follow-up of diverticulitis.  I saw her in November 2023 after a second bout of diverticulitis with abscess.  She did not want to proceed with surgery at that time.  She saw Dr. Horowitz in December 2024 who also recommended sigmoid colectomy.  She [place] and the following day started having abdominal pain.  She saw the doctor in her hotel [place] who prescribed her oral antibiotics.  Those made her feel somewhat better and she returned home on New Year's [place].  The following day she felt more unwell and went to the hospital where diverticulitis with abscess was identified and she was admitted for IR drain placement.  She reports she has been doing well since discharge.  She is no longer having any abdominal pain.  She is having intermittent nausea.  She is still taking PO antibiotics.  She is still having between 10 and 40 cc daily of sanguinouspurulent fluid from her drain.  She reports she recently had a colonoscopy which showed narrowing of the sigmoid colon     Past Medical History  She has a past medical history of Colon polyp, Diverticulitis of intestine with abscess, and Flushing.    Surgical History  She has a past surgical history that includes Oophorectomy (04/10/2014); Oophorectomy (Bilateral); and Colonoscopy (08/22/2022).     Social History  She reports that she has quit smoking. Her smoking use included cigarettes. She has a 2.6 pack-year smoking history. She has never used smokeless tobacco. She reports current alcohol use. She reports that she does not use drugs.    Family History  Family History   Problem Relation Name Age of Onset    Diabetes Mother Sabiha     Hypertension Mother Sabiha     Hypertension Father      Hypertension Sister      Diabetes Brother Zach         Allergies  Patient has no known allergies.    Review of Systems   Constitutional:  Negative for chills, fever and unexpected weight  change.   HENT:  Negative for sneezing, sore throat, trouble swallowing and voice change.    Respiratory:  Negative for chest tightness and shortness of breath.    Cardiovascular:  Negative for chest pain and palpitations.   Gastrointestinal:  Positive for nausea. Negative for abdominal pain, blood in stool, diarrhea and vomiting.   Endocrine: Negative for cold intolerance and heat intolerance.   Genitourinary:  Negative for decreased urine volume, dysuria and hematuria.   Musculoskeletal:  Negative for arthralgias and gait problem.   Skin:  Negative for rash and wound.   Neurological:  Negative for facial asymmetry, speech difficulty and headaches.   Hematological:  Negative for adenopathy. Does not bruise/bleed easily.   Psychiatric/Behavioral:  Negative for self-injury and suicidal ideas.         Physical Exam  Vitals and nursing note reviewed.   Constitutional:       Appearance: Normal appearance.   HENT:      Head: Normocephalic and atraumatic.      Mouth/Throat:      Mouth: Mucous membranes are moist.      Pharynx: Oropharynx is clear.   Eyes:      Extraocular Movements: Extraocular movements intact.      Pupils: Pupils are equal, round, and reactive to light.   Cardiovascular:      Rate and Rhythm: Normal rate and regular rhythm.      Pulses: Normal pulses.   Pulmonary:      Effort: Pulmonary effort is normal.      Breath sounds: Normal breath sounds.   Abdominal:      General: There is no distension.      Palpations: Abdomen is soft.      Tenderness: There is no abdominal tenderness.      Comments: IR drain in left lower quadrant with sanguinous-purulent fluid   Musculoskeletal:      Cervical back: Normal range of motion and neck supple.   Skin:     General: Skin is warm and dry.   Neurological:      General: No focal deficit present.      Mental Status: She is alert and oriented to person, place, and time.   Psychiatric:         Mood and Affect: Mood normal.         Behavior: Behavior normal.          Last  "Recorded Vitals  Blood pressure 110/60, pulse 74, temperature 36.4 °C (97.6 °F), temperature source Oral, height 1.651 m (5' 5\"), weight 69.5 kg (153 lb 3.2 oz), SpO2 99%.    Relevant Results  Reviewed labs and imaging from recent hospital stay  Recent colonoscopy report not available through epic     Assessment/Plan   Problem List Items Addressed This Visit             ICD-10-CM       Gastrointestinal and Abdominal    Diverticulitis of intestine - Primary K57.92    Relevant Orders    IR abdomen drain check     64-year-old female with recurrent diverticulitis with abscess, now with drain placement.  Given that she still having mild symptoms with nausea and some purulent fluid from the drain, I recommended we get a drain study before removing the tube.  Recommended surgery in 2 to 3 months once the inflammation and infection have time to improve.  She will follow-up with me to the drain study to discuss more specifics about surgery and timing.    Rosette Madera MD    "

## 2025-01-14 ENCOUNTER — TELEPHONE (OUTPATIENT)
Dept: SURGERY | Facility: CLINIC | Age: 65
End: 2025-01-14
Payer: COMMERCIAL

## 2025-01-14 ENCOUNTER — HOSPITAL ENCOUNTER (OUTPATIENT)
Dept: RADIOLOGY | Facility: HOSPITAL | Age: 65
Discharge: HOME | End: 2025-01-14
Payer: COMMERCIAL

## 2025-01-14 DIAGNOSIS — K57.20 DIVERTICULITIS OF LARGE INTESTINE WITH ABSCESS WITHOUT BLEEDING: ICD-10-CM

## 2025-01-14 PROCEDURE — 2550000001 HC RX 255 CONTRASTS: Performed by: STUDENT IN AN ORGANIZED HEALTH CARE EDUCATION/TRAINING PROGRAM

## 2025-01-14 PROCEDURE — 76080 X-RAY EXAM OF FISTULA: CPT

## 2025-01-14 RX ADMIN — IOHEXOL 15 ML: 240 INJECTION, SOLUTION INTRATHECAL; INTRAVASCULAR; INTRAVENOUS; ORAL at 14:30

## 2025-01-14 NOTE — TELEPHONE ENCOUNTER
----- Message from Pepito Cespedes sent at 1/14/2022  4:34 PM EST -----  Subject: Appointment Request    Reason for Call: Routine Physical Exam    QUESTIONS  Type of Appointment? Established Patient  Reason for appointment request? No appointments available during search  Additional Information for Provider? pt needing to schedule 4 month   f/u/awv. preferably next Wednesday 1/19 between 3-4pm  ---------------------------------------------------------------------------  --------------  CALL BACK INFO  What is the best way for the office to contact you? OK to leave message on   voicemail  Preferred Call Back Phone Number? [phone]  ---------------------------------------------------------------------------  --------------  SCRIPT ANSWERS  Relationship to Patient? Other  Representative Name? Phillip Nathan  Additional information verified (besides Name and Date of Birth)? Address  Specialty Confirmation? Primary Care  (Is the patient requesting their annual physical and does not need PAP or   AWV per above?)? Yes   Have you been diagnosed with, awaiting test results for, or told that you   are suspected of having COVID-19 (Coronavirus)? (If patient has tested   negative or was tested as a requirement for work, school, or travel and   not based on symptoms, answer no)? No  Within the past two weeks have you developed any of the following symptoms   (answer no if symptoms have been present longer than 2 weeks or began   more than 2 weeks ago)? Fever or Chills, Cough, Shortness of breath or   difficulty breathing, Loss of taste or smell, Sore throat, Nasal   congestion, Sneezing or runny nose, Fatigue or generalized body aches   (answer no if pain is specific to a body part e.g. back pain), Diarrhea,   Headache? No  Have you had close contact with someone with COVID-19 in the last 14 days? No  (Service Expert  click yes below to proceed with [employer]ual   Scheduling)?  Yes Patient contacted office to inform us she having test today at Radiology for abscess/drain study.

## 2025-01-14 NOTE — TELEPHONE ENCOUNTER
Spoke to pt verified by name/.  Pt is scheduled for follow up appt on 25.   Pt verbalized understanding and denies further questions.

## 2025-01-16 ENCOUNTER — TELEPHONE (OUTPATIENT)
Dept: SURGERY | Facility: CLINIC | Age: 65
End: 2025-01-16
Payer: COMMERCIAL

## 2025-01-16 NOTE — TELEPHONE ENCOUNTER
Spoke to pt verified by name/.  Pt was last seen in the office on 1/10/25 for follow up of diverticulitis.  Pt had IR drain removed on 25.  Pt is calling stating that she missed work this week d/t 8/10 lower abd pain and   Slight headaches. Pt is requesting to have RTW letter for -25. Pls review/advise if ok   To write RTW letter for pt.

## 2025-01-16 NOTE — LETTER
January 16, 2025     Patient: Ivana Funes   YOB: 1960   Date of Visit: 1/16/2025       To Whom It May Concern:    Ivana Funes is a patient in my practice and she is currently under my care . Please excuse Ivana for her absence from work 1/13-1/16/25. Ivana may return to work on her next scheduled day.     If you have any questions or concerns, please don't hesitate to call the office at [phone].         Sincerely,         Rosette Madera MD

## 2025-01-16 NOTE — TELEPHONE ENCOUNTER
Letter sent to pt's MyChart.   Spoke to pt verified by name/.  Pt informed and verbalized understanding and denies further questions.

## 2025-01-21 ENCOUNTER — OFFICE VISIT (OUTPATIENT)
Dept: SURGERY | Facility: CLINIC | Age: 65
End: 2025-01-21
Payer: COMMERCIAL

## 2025-01-21 VITALS
TEMPERATURE: 97 F | DIASTOLIC BLOOD PRESSURE: 60 MMHG | WEIGHT: 153 LBS | HEIGHT: 64 IN | SYSTOLIC BLOOD PRESSURE: 124 MMHG | OXYGEN SATURATION: 98 % | BODY MASS INDEX: 26.12 KG/M2 | HEART RATE: 73 BPM

## 2025-01-21 DIAGNOSIS — K57.20 DIVERTICULITIS OF LARGE INTESTINE WITH ABSCESS WITHOUT BLEEDING: Primary | ICD-10-CM

## 2025-01-21 PROCEDURE — 3008F BODY MASS INDEX DOCD: CPT | Performed by: STUDENT IN AN ORGANIZED HEALTH CARE EDUCATION/TRAINING PROGRAM

## 2025-01-21 PROCEDURE — 99214 OFFICE O/P EST MOD 30 MIN: CPT | Performed by: STUDENT IN AN ORGANIZED HEALTH CARE EDUCATION/TRAINING PROGRAM

## 2025-01-21 PROCEDURE — 1159F MED LIST DOCD IN RCRD: CPT | Performed by: STUDENT IN AN ORGANIZED HEALTH CARE EDUCATION/TRAINING PROGRAM

## 2025-01-21 PROCEDURE — 1111F DSCHRG MED/CURRENT MED MERGE: CPT | Performed by: STUDENT IN AN ORGANIZED HEALTH CARE EDUCATION/TRAINING PROGRAM

## 2025-01-21 PROCEDURE — 1036F TOBACCO NON-USER: CPT | Performed by: STUDENT IN AN ORGANIZED HEALTH CARE EDUCATION/TRAINING PROGRAM

## 2025-01-21 PROCEDURE — 1126F AMNT PAIN NOTED NONE PRSNT: CPT | Performed by: STUDENT IN AN ORGANIZED HEALTH CARE EDUCATION/TRAINING PROGRAM

## 2025-01-21 ASSESSMENT — ENCOUNTER SYMPTOMS
FEVER: 0
CHILLS: 0
VOICE CHANGE: 0
ARTHRALGIAS: 0
NAUSEA: 0
ADENOPATHY: 0
DIARRHEA: 0
BRUISES/BLEEDS EASILY: 0
WOUND: 0
UNEXPECTED WEIGHT CHANGE: 0
VOMITING: 0
SPEECH DIFFICULTY: 0
ABDOMINAL PAIN: 0
SHORTNESS OF BREATH: 0
FACIAL ASYMMETRY: 0
DYSURIA: 0
BLOOD IN STOOL: 0
SORE THROAT: 0
CHEST TIGHTNESS: 0
PALPITATIONS: 0
TROUBLE SWALLOWING: 0
HEADACHES: 0
HEMATURIA: 0

## 2025-01-21 ASSESSMENT — PAIN SCALES - GENERAL: PAINLEVEL_OUTOF10: 0-NO PAIN

## 2025-01-21 NOTE — PROGRESS NOTES
History Of Present Illness  Ivana Funes is a 64 y.o. female following up after drain study.  She had diverticulitis with abscess and an IR drain.  She was still having some cloudy drainage so I ordered a drain study which showed the abscess collection had resolved and her drain was removed.  She is feeling back to normal.  She is tolerating a diet without any nausea or vomiting.  No longer having any abdominal pain.     Past Medical History  She has a past medical history of Colon polyp, Diverticulitis of intestine with abscess, and Flushing.    Surgical History  She has a past surgical history that includes Oophorectomy (04/10/2014); Oophorectomy (Bilateral); and Colonoscopy (08/22/2022).     Social History  She reports that she has quit smoking. Her smoking use included cigarettes. She has a 2.6 pack-year smoking history. She has never used smokeless tobacco. She reports current alcohol use. She reports that she does not use drugs.    Family History  Family History   Problem Relation Name Age of Onset    Diabetes Mother Sabiha     Hypertension Mother Sabiha     Hypertension Father      Hypertension Sister      Diabetes Brother Zach         Allergies  Patient has no known allergies.    Review of Systems   Constitutional:  Negative for chills, fever and unexpected weight change.   HENT:  Negative for sneezing, sore throat, trouble swallowing and voice change.    Respiratory:  Negative for chest tightness and shortness of breath.    Cardiovascular:  Negative for chest pain and palpitations.   Gastrointestinal:  Negative for abdominal pain, blood in stool, diarrhea, nausea and vomiting.   Endocrine: Negative for cold intolerance and heat intolerance.   Genitourinary:  Negative for decreased urine volume, dysuria and hematuria.   Musculoskeletal:  Negative for arthralgias and gait problem.   Skin:  Negative for rash and wound.   Neurological:  Negative for facial asymmetry, speech difficulty and headaches.  "  Hematological:  Negative for adenopathy. Does not bruise/bleed easily.   Psychiatric/Behavioral:  Negative for self-injury and suicidal ideas.         Physical Exam  Vitals and nursing note reviewed.   Constitutional:       Appearance: Normal appearance.   HENT:      Head: Normocephalic and atraumatic.      Mouth/Throat:      Mouth: Mucous membranes are moist.      Pharynx: Oropharynx is clear.   Eyes:      Extraocular Movements: Extraocular movements intact.      Pupils: Pupils are equal, round, and reactive to light.   Cardiovascular:      Rate and Rhythm: Normal rate and regular rhythm.      Pulses: Normal pulses.   Pulmonary:      Effort: Pulmonary effort is normal.      Breath sounds: Normal breath sounds.   Abdominal:      General: There is no distension.      Palpations: Abdomen is soft.      Tenderness: There is no abdominal tenderness.      Comments: Drain site well-healing with a small scab over top   Musculoskeletal:      Cervical back: Normal range of motion and neck supple.   Skin:     General: Skin is warm and dry.   Neurological:      General: No focal deficit present.      Mental Status: She is alert and oriented to person, place, and time.   Psychiatric:         Mood and Affect: Mood normal.         Behavior: Behavior normal.          Last Recorded Vitals  Blood pressure 124/60, pulse 73, temperature 36.1 °C (97 °F), height 1.626 m (5' 4\"), weight 69.4 kg (153 lb), SpO2 98%.    Relevant Results  IMPRESSION:  Abscessogram revealing no distensible cavity or sinus tract. The  drain was removed.     Assessment/Plan   Problem List Items Addressed This Visit             ICD-10-CM    Diverticulitis of intestine - Primary K57.92     Recovering well after an episode of diverticulitis with abscess formation now status post drain removal.  We discussed proceeding with a sigmoid colectomy to prevent further recurrences.  And discussed the surgery in detail including risks and benefits.  Explained the expected " postoperative course with hospital stay, diet and lifting restrictions.  All questions were answered.  She needs to talk with her work and we discussed doing this at the beginning of April.  She will call back when she is ready to schedule surgery.      Rosette Madera MD